# Patient Record
Sex: MALE | Race: BLACK OR AFRICAN AMERICAN | NOT HISPANIC OR LATINO | Employment: FULL TIME | URBAN - METROPOLITAN AREA
[De-identification: names, ages, dates, MRNs, and addresses within clinical notes are randomized per-mention and may not be internally consistent; named-entity substitution may affect disease eponyms.]

---

## 2019-11-15 ENCOUNTER — OFFICE VISIT (OUTPATIENT)
Dept: OPHTHALMOLOGY | Facility: CLINIC | Age: 34
End: 2019-11-15
Payer: COMMERCIAL

## 2019-11-15 DIAGNOSIS — H18.421 BAND KERATOPATHY OF RIGHT EYE: ICD-10-CM

## 2019-11-15 DIAGNOSIS — H40.052 BORDERLINE GLAUCOMA OF LEFT EYE WITH OCULAR HYPERTENSION: ICD-10-CM

## 2019-11-15 DIAGNOSIS — H17.9 CORNEAL SCAR, RIGHT EYE: Primary | ICD-10-CM

## 2019-11-15 DIAGNOSIS — H50.111 EXOTROPIA OF RIGHT EYE: ICD-10-CM

## 2019-11-15 PROCEDURE — 92002 INTRM OPH EXAM NEW PATIENT: CPT | Performed by: STUDENT IN AN ORGANIZED HEALTH CARE EDUCATION/TRAINING PROGRAM

## 2019-11-15 ASSESSMENT — CONF VISUAL FIELD
OD_SUPERIOR_NASAL_RESTRICTION: 1
OD_INFERIOR_NASAL_RESTRICTION: 1
METHOD: COUNTING FINGERS
OS_NORMAL: 1
OD_INFERIOR_TEMPORAL_RESTRICTION: 1
OD_SUPERIOR_TEMPORAL_RESTRICTION: 1

## 2019-11-15 ASSESSMENT — TONOMETRY
OS_IOP_MMHG: 25
OD_IOP_MMHG: XX
OS_IOP_MMHG: 25
IOP_METHOD: APPLANATION
OD_IOP_MMHG: 21
IOP_METHOD: ICARE

## 2019-11-15 ASSESSMENT — SLIT LAMP EXAM - LIDS
COMMENTS: NORMAL
COMMENTS: NORMAL

## 2019-11-15 ASSESSMENT — VISUAL ACUITY
OD_SC: NLP
OS_SC+: -2
OS_SC: 20/20
METHOD: SNELLEN - LINEAR

## 2019-11-15 ASSESSMENT — EXTERNAL EXAM - LEFT EYE: OS_EXAM: NORMAL

## 2019-11-15 ASSESSMENT — REFRACTION_MANIFEST
OS_AXIS: 170
OS_SPHERE: PLANO
OS_CYLINDER: +0.25

## 2019-11-15 ASSESSMENT — EXTERNAL EXAM - RIGHT EYE: OD_EXAM: LARGE XT

## 2019-11-15 ASSESSMENT — CUP TO DISC RATIO: OS_RATIO: 0.4

## 2019-11-15 NOTE — PROGRESS NOTES
Current Eye Medications:  none     Subjective:  Complete eye exam. Doesn't have any vision right eye for last 10 plus years. Was told possibly cataract. Patient does not recall a specific injury, but says that the eye was hurting and at the [place] and did [place] access to proper care. Vision is doing well left eye. Right eye is sensitive to touch, causes severe stabbing pain like going straight to heart.      Objective:  See Ophthalmology Exam.       Assessment:  Rayo Rey is a 34 year old male who presents with:   Encounter Diagnoses   Name Primary?     Corneal scar, right eye Not a likely candidate for corneal transplant, but desired referral with cornea specialist (perhaps consideration of chelation of band keratopathy).     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      Exotropia of right eye        Plan:  Recommend wearing polycarbonate glasses to protect the left eye    Referral to the [facility] (Dr. Bundy) for corneal evaluation    Jhon Mart MD  [phone]

## 2019-11-15 NOTE — PATIENT INSTRUCTIONS
Recommend wearing polycarbonate glasses to protect the left eye    Referral to the [facility] (Dr. Bundy) for corneal evaluation    John Mart MD  [phone]

## 2019-11-15 NOTE — LETTER
11/15/2019         RE: Rayo Rey  [address] [place] [address]        Dear Colleague,    Thank you for referring your patient, Rayo Rey, to the [facility]. Please see a copy of my visit note below.     Current Eye Medications:  none     Subjective:  Complete eye exam. Doesn't have any vision right eye for last 10 plus years. Was told possibly cataract. Patient does not recall a specific injury, but says that the eye was hurting and at the time [place] and did not have access to proper care. Vision is doing well left eye. Right eye is sensitive to touch, causes severe stabbing pain like going straight to heart.      Objective:  See Ophthalmology Exam.       Assessment:  Rayo Rey is a 34 year old male who presents with:   Encounter Diagnoses   Name Primary?     Corneal scar, right eye Not a likely candidate for corneal transplant, but desired referral with cornea specialist (perhaps consideration of chelation of band keratopathy).     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      Exotropia of right eye        Plan:  Recommend wearing polycarbonate glasses to protect the left eye    Referral to the [facility] (Dr. Bundy) for corneal evaluation    John Mart MD  [phone]        Again, thank you for allowing me to participate in the care of your patient.        Sincerely,        John Mart MD

## 2019-12-23 ENCOUNTER — OFFICE VISIT (OUTPATIENT)
Dept: OPHTHALMOLOGY | Facility: CLINIC | Age: 34
End: 2019-12-23
Attending: STUDENT IN AN ORGANIZED HEALTH CARE EDUCATION/TRAINING PROGRAM
Payer: COMMERCIAL

## 2019-12-23 DIAGNOSIS — H40.002 GLAUCOMA SUSPECT OF LEFT EYE: Primary | ICD-10-CM

## 2019-12-23 DIAGNOSIS — H18.421 BAND KERATOPATHY OF RIGHT EYE: ICD-10-CM

## 2019-12-23 DIAGNOSIS — H44.529 PHTHISICAL CORNEA: ICD-10-CM

## 2019-12-23 DIAGNOSIS — H17.9 CORNEAL SCAR, RIGHT EYE: ICD-10-CM

## 2019-12-23 PROBLEM — H17.11 CENTRAL CORNEAL OPACITY OF RIGHT EYE: Status: ACTIVE | Noted: 2019-11-06

## 2019-12-23 PROCEDURE — 76514 ECHO EXAM OF EYE THICKNESS: CPT | Mod: ZF,50 | Performed by: OPHTHALMOLOGY

## 2019-12-23 PROCEDURE — 76512 OPH US DX B-SCAN: CPT | Mod: ZF,RT | Performed by: OPHTHALMOLOGY

## 2019-12-23 PROCEDURE — G0463 HOSPITAL OUTPT CLINIC VISIT: HCPCS | Mod: ZF

## 2019-12-23 ASSESSMENT — CUP TO DISC RATIO: OS_RATIO: 0.35

## 2019-12-23 ASSESSMENT — EXTERNAL EXAM - RIGHT EYE: OD_EXAM: LARGE XT

## 2019-12-23 ASSESSMENT — SLIT LAMP EXAM - LIDS
COMMENTS: NORMAL
COMMENTS: NORMAL

## 2019-12-23 ASSESSMENT — EXTERNAL EXAM - LEFT EYE: OS_EXAM: NORMAL

## 2019-12-23 ASSESSMENT — TONOMETRY
OD_IOP_MMHG: 55
IOP_METHOD: ICARE
IOP_METHOD: ICARE
OD_IOP_MMHG: 55
OS_IOP_MMHG: 24
OS_IOP_MMHG: 30

## 2019-12-23 ASSESSMENT — PACHYMETRY
OS_CT(UM): 630
OD_CT(UM): 200

## 2019-12-23 ASSESSMENT — CONF VISUAL FIELD
OD_SUPERIOR_NASAL_RESTRICTION: 1
OD_SUPERIOR_TEMPORAL_RESTRICTION: 1
OD_INFERIOR_TEMPORAL_RESTRICTION: 1
OD_INFERIOR_NASAL_RESTRICTION: 1

## 2019-12-23 ASSESSMENT — VISUAL ACUITY
OS_SC: J1+
OD_SC: NLP
OS_SC: 20/20
METHOD: SNELLEN - LINEAR

## 2019-12-23 NOTE — NURSING NOTE
Chief Complaints and History of Present Illnesses   Patient presents with     Corneal Evaluation     Chief Complaint(s) and History of Present Illness(es)     Corneal Evaluation     Laterality: right eye    Onset: 26 years ago    Severity: complete loss of vision    Frequency: constantly    Timing: throughout the day    Associated symptoms: Negative for eye pain, redness, tearing, itching, discharge and dryness    Treatments tried: no treatments    Pain scale: 0/10              Comments     Eval for Band Keratopathy of RE per referral request of Dr. Mart.  RE painful to touch, otherwise comfortable. Pt states that at that time, it is an intolerable sharp stabbing pain, a 10/10 on pain scale. It can take up to a minute to resolve.   Pt states has no recollection of any eye trauma that would have occurred to bring on pain and poor VA of RE. Pt states that these symptoms started in 1993. [place] was [place], much conflict in [place].   Pt states currently not using any eye meds or art tears.  Nicole Snell, COT COT 1:46 PM 12/23/2019

## 2019-12-23 NOTE — LETTER
"12/23/2019     RE: Rayo Rey  [address] [place] [address]     Dear Colleague,    Thank you for referring your patient, Rayo Rey, to the EYE CLINIC at [facility]. Please see a copy of my visit note below.    CC: cloudy vision right eye    HPI: Rayo eRy is a 34 year old male here for evaluation of cloudy vision in the right eye. Patient noted tearing/eye pain/blurry vision beginning at age 9 while in [facility]. His vision rapidly progressed to \"completely dark\" within a couple of years. He has not seen an eye care provider since onset of vision loss up until last month when he saw Dr Mart, who noted NLP vision, corneal scarring with band K, and mild ocular hypertension (in the left eye). He was referred here for evaluation of K scarring/band K and to discuss if anything could be done to improve the vision.     Past medical hx: None    Past ocular hx: as above    Ocular gtts: none    ASSESSMENT/PLAN:   1) Corneal scarring/band keratopathy, right eye  -unknown etiology- painful vision occurred at age 9 while in [facility]  -NLP vision  -high IOP (likely lower than measured with icare due to scarring, but feels moderately firm to palpation). 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 High IOP OD>>OS.  -right eye very sensitive to touch, possibly early phthisical.    2) ocular hypertension, left eye  -healthy appearing nerve  -? " Family history of glaucoma- uncle  -recommend glaucoma eval with HVF, rnfl    --    --------------------------------------------------------------------------------------------------------------------------------------------  Pachymetry - Interpretation & Report  Indication: glaucoma suspect OS  Performed by: Jules Bundy MD  Reliability: good  Patient cooperation: good  Findings:   Right eye:  200 micrometers centrally    Left eye:  630 micrometers centrally   Interval Change, Assessment, & Impact on treatment:   Right eye:  Thin, unreliable due to band keratopathy   Left eye:  Thick corneal, low risk of glaucoma   Signed: Jules Bundy MD 12/23/2019 11:36 PM        Again, thank you for allowing me to participate in the care of your patient.      Sincerely,    Jules Bundy MD

## 2019-12-23 NOTE — PROGRESS NOTES
"CC: cloudy vision right eye    HPI: Rayo Rey is a 34 year old male here for evaluation of cloudy vision in the right eye. Patient noted tearing/eye pain/blurry vision beginning at age 9 while in [facility]. His vision rapidly progressed to \"completely dark\" within a couple of years. He has not seen an eye care provider since onset of vision loss up until last month when he saw Dr Mart, who noted NLP vision, corneal scarring with band K, and mild ocular hypertension (in the left eye). He was referred here for evaluation of K scarring/band K and to discuss if anything could be done to improve the vision.     Past medical hx: None    Past ocular hx: as above    Ocular gtts: none    ASSESSMENT/PLAN:   1) Corneal scarring/band keratopathy, right eye  -unknown etiology- painful vision occurred at age 9 while in [facility]  -NLP vision  -high IOP (likely lower than measured with icare due to scarring, but feels moderately firm to palpation). 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 High IOP OD>>OS.  -right eye very sensitive to touch, possibly early phthisical.    2) ocular hypertension, left eye  -healthy appearing nerve  -? Family history of glaucoma- uncle  -recommend glaucoma eval with HVF, rnfl    --  Clive Johns MD  PGY 5, Cornea Fellow  Ophthalmology    Attending Physician Attestation:  Complete documentation of historical and exam elements from today's encounter can be " found in the full encounter summary report (not reduplicated in this progress note).  I personally obtained the chief complaint(s) and history of present illness.  I confirmed and edited as necessary the review of systems, past medical/surgical history, family history, social history, and examination findings as documented by others; and I examined the patient myself.  I personally reviewed the relevant tests, images, and reports as documented above.  I formulated and edited as necessary the assessment and plan and discussed the findings and management plan with the patient and family. I personally reviewed the ophthalmic test(s) associated with this encounter, agree with the interpretation(s) as documented by the resident/fellow, and have edited the corresponding report(s) as necessary. - Jules Bundy MD    --------------------------------------------------------------------------------------------------------------------------------------------  Pachymetry - Interpretation & Report  Indication: glaucoma suspect OS  Performed by: Jules Bundy MD  Reliability: good  Patient cooperation: good  Findings:   Right eye:  200 micrometers centrally    Left eye:  630 micrometers centrally   Interval Change, Assessment, & Impact on treatment:   Right eye:  Thin, unreliable due to band keratopathy   Left eye:  Thick corneal, low risk of glaucoma   Signed: Jules Bundy MD 12/23/2019 11:36 PM      I personally spent great than 40min with the patient, of which >50% of the time was spent face to face with the patient, counseling and coordinating care with the patient. We discussed the complexity of his diagnosis, the need for further information prior to proceeding with yet another surgery, and the unknown prognosis for the patient at this time.    Jules Bundy MD

## 2020-02-26 ENCOUNTER — OFFICE VISIT (OUTPATIENT)
Dept: OPHTHALMOLOGY | Facility: CLINIC | Age: 35
End: 2020-02-26
Attending: OPHTHALMOLOGY
Payer: COMMERCIAL

## 2020-02-26 DIAGNOSIS — H18.421 BAND KERATOPATHY OF RIGHT EYE: ICD-10-CM

## 2020-02-26 DIAGNOSIS — H40.052 BORDERLINE GLAUCOMA OF LEFT EYE WITH OCULAR HYPERTENSION: ICD-10-CM

## 2020-02-26 DIAGNOSIS — H44.529 PHTHISICAL CORNEA: Primary | ICD-10-CM

## 2020-02-26 PROCEDURE — 25000125 ZZHC RX 250: Mod: ZF

## 2020-02-26 PROCEDURE — G0463 HOSPITAL OUTPT CLINIC VISIT: HCPCS | Mod: ZF

## 2020-02-26 ASSESSMENT — TONOMETRY
IOP_METHOD: TONOPEN
OS_IOP_MMHG: 28
OD_IOP_MMHG: 85

## 2020-02-26 ASSESSMENT — CONF VISUAL FIELD
OD_SUPERIOR_TEMPORAL_RESTRICTION: 1
OD_INFERIOR_NASAL_RESTRICTION: 1
OD_SUPERIOR_NASAL_RESTRICTION: 1
OD_INFERIOR_TEMPORAL_RESTRICTION: 1
METHOD: COUNTING FINGERS
OS_NORMAL: 1

## 2020-02-26 ASSESSMENT — VISUAL ACUITY
OD_SC: NLP
OS_SC: 20/20
METHOD: SNELLEN - LINEAR

## 2020-02-26 ASSESSMENT — EXTERNAL EXAM - LEFT EYE: OS_EXAM: NORMAL

## 2020-02-26 ASSESSMENT — SLIT LAMP EXAM - LIDS
COMMENTS: NORMAL
COMMENTS: NORMAL

## 2020-02-26 ASSESSMENT — EXTERNAL EXAM - RIGHT EYE: OD_EXAM: LARGE XT

## 2020-02-26 ASSESSMENT — CUP TO DISC RATIO: OS_RATIO: 0.4

## 2020-02-26 NOTE — NURSING NOTE
Chief Complaints and History of Present Illnesses   Patient presents with     Follow Up     Corneal scarring/band keratopathy, right eye     Chief Complaint(s) and History of Present Illness(es)     Follow Up     Laterality: right eye    Course: stable    Associated symptoms: eye pain and redness (mild in right eye).  Negative for tearing and dryness    Comments: Corneal scarring/band keratopathy, right eye              Comments     He states that he has pain when something touches his right eye.  He does not have have pain in that eye if it is not being touched.  His vision in the left eye seems stable.    Yue Tierney, COT 9:49 AM  February 26, 2020

## 2020-02-26 NOTE — PROGRESS NOTES
"CC: cloudy vision right eye    HPI: Rayo Rey is a 34 year old male here for evaluation of cloudy vision in the right eye. Patient noted tearing/eye pain/blurry vision beginning at age 9 while in [facility]. His vision rapidly progressed to \"completely dark\" within a couple of years. He has not seen an eye care provider since onset of vision loss up until last month when he saw Dr Mart, who noted NLP vision, corneal scarring with band K, and mild ocular hypertension (in the left eye). He was referred here for evaluation of K scarring/band K and to discuss if anything could be done to improve the vision.     Interval Hx: States has some right eye pain only when he touches the eye. No pain when not touching the eye. Vision stable left eye. No floaters, flashes of light, photophobia or pain in left eye.    Past medical hx: None    Past ocular hx: as above    Ocular gtts: none    ASSESSMENT/PLAN:   1) Corneal scarring/band keratopathy, right eye  -unknown etiology - painful vision occurred at age 9 while in [facility]  -NLP vision  -high IOP (likely lower than measured with icare due to scarring, but feels moderately firm to palpation). 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 High IOP OD>>OS  -could consider EDTA chelation of band K for comfort +/- K tattooing vs CL for cosmesis  -right eye very sensitive to touch, possibly early phthisical  -discussed with patient today and will proceed with EDTA chelation OD     2) ocular hypertension, left eye  -? Family history of glaucoma- uncle  -recommend glaucoma eval with HVF, rnfl    Follow-up: Schedule EDTA chelation OD. Glaucoma next " available    Roscoe Rubio MD  Ophthalmology Resident, PGY-3    Attending Physician Attestation:  Complete documentation of historical and exam elements from today's encounter can be found in the full encounter summary report (not reduplicated in this progress note).  I personally obtained the chief complaint(s) and history of present illness.  I confirmed and edited as necessary the review of systems, past medical/surgical history, family history, social history, and examination findings as documented by others; and I examined the patient myself.  I personally reviewed the relevant tests, images, and reports as documented above.  I formulated and edited as necessary the assessment and plan and discussed the findings and management plan with the patient and family. - Jules Bundy MD

## 2020-03-11 NOTE — PROGRESS NOTES
"CC: cloudy vision right eye    HPI: Rayo Rey is a 34 year old male here for evaluation of cloudy vision in the right eye. Patient noted tearing/eye pain/blurry vision beginning at age 9 while in [facility]. His vision rapidly progressed to \"completely dark\" within a couple of years. He has not seen an eye care provider since onset of vision loss up until last month when he saw Dr Mart, who noted NLP vision, corneal scarring with band K, and mild ocular hypertension (in the left eye). He was referred here for evaluation of K scarring/band K and to discuss if anything could be done to improve the vision.     Interval Hx: States has some right eye pain only when he touches the eye. No pain when not touching the eye. Vision stable left eye. No floaters, flashes of light, photophobia or pain in left eye.    Past medical hx: None    Past ocular hx: as above    Ocular gtts: none    ASSESSMENT/PLAN:   1) Corneal scarring/band keratopathy, right eye  -unknown etiology - painful vision occurred at age 9 while in [facility]  -NLP vision  -high IOP (likely lower than measured with icare due to scarring, but feels moderately firm to palpation). 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 High IOP OD>>OS  -recommend EDTA chelation of band K for comfort +/- K tattooing vs CL for cosmesis  -right eye very sensitive to touch, possibly early phthisical  -discussed with patient today and will proceed with EDTA chelation OD     2) ocular hypertension, left eye  -? Family history of glaucoma- uncle  -recommend glaucoma eval with HVF, rnfl    Follow-up: 1 week, Glaucoma next available    Attending Physician " Attestation:  Complete documentation of historical and exam elements from today's encounter can be found in the full encounter summary report (not reduplicated in this progress note).  I personally obtained the chief complaint(s) and history of present illness.  I confirmed and edited as necessary the review of systems, past medical/surgical history, family history, social history, and examination findings as documented by others; and I examined the patient myself.  I personally reviewed the relevant tests, images, and reports as documented above.  I formulated and edited as necessary the assessment and plan and discussed the findings and management plan with the patient and family. I was present for the key portions of the procedure and immediately available for the remainder. - Jules Bundy MD

## 2020-03-13 ENCOUNTER — OFFICE VISIT (OUTPATIENT)
Dept: OPHTHALMOLOGY | Facility: CLINIC | Age: 35
End: 2020-03-13
Attending: OPHTHALMOLOGY
Payer: COMMERCIAL

## 2020-03-13 DIAGNOSIS — H18.421 BAND KERATOPATHY OF RIGHT EYE: ICD-10-CM

## 2020-03-13 DIAGNOSIS — Z98.890 POSTOPERATIVE EYE STATE: Primary | ICD-10-CM

## 2020-03-13 PROCEDURE — G0463 HOSPITAL OUTPT CLINIC VISIT: HCPCS | Mod: ZF

## 2020-03-13 PROCEDURE — 65436 CURETTE/TREAT CORNEA: CPT | Mod: ZF | Performed by: OPHTHALMOLOGY

## 2020-03-13 RX ORDER — IBUPROFEN 200 MG
400 TABLET ORAL ONCE
Status: COMPLETED | OUTPATIENT
Start: 2020-03-13 | End: 2020-03-13

## 2020-03-13 RX ORDER — PREDNISOLONE ACETATE 10 MG/ML
1 SUSPENSION/ DROPS OPHTHALMIC 4 TIMES DAILY
Qty: 10 ML | Refills: 1 | Status: SHIPPED | OUTPATIENT
Start: 2020-03-13 | End: 2020-08-05

## 2020-03-13 RX ORDER — OFLOXACIN 3 MG/ML
1 SOLUTION/ DROPS OPHTHALMIC 4 TIMES DAILY
Qty: 10 ML | Refills: 1 | Status: SHIPPED | OUTPATIENT
Start: 2020-03-13 | End: 2020-08-05

## 2020-03-13 RX ADMIN — Medication 400 MG: at 13:06

## 2020-03-13 RX ADMIN — Medication: at 13:21

## 2020-03-13 ASSESSMENT — TONOMETRY
IOP_METHOD: ICARE
OS_IOP_MMHG: 37
OD_IOP_MMHG: 18

## 2020-03-13 ASSESSMENT — VISUAL ACUITY
METHOD: SNELLEN - LINEAR
OS_SC: 20/20
OD_SC: NLP

## 2020-03-13 ASSESSMENT — CONF VISUAL FIELD
OS_NORMAL: 1
OD_INFERIOR_NASAL_RESTRICTION: 1
OD_SUPERIOR_NASAL_RESTRICTION: 1
OD_INFERIOR_TEMPORAL_RESTRICTION: 1
OD_SUPERIOR_TEMPORAL_RESTRICTION: 1

## 2020-03-13 NOTE — PATIENT INSTRUCTIONS
Start ofloxacin four times daily  Start prednisolone acetate four times daily  Start artificial tears (preservative free) sample provided but you will need to buy more, 4 times daily or as needed for comfort    The eye will be red and irritated today and pink tears are normal. Do not get water in the eye.   Use artificial tears as needed for comfort. Celluvisc gel may provide further comfort.    Work note was provided for off work until next visit on Monday.

## 2020-03-13 NOTE — NURSING NOTE
Chief Complaints and History of Present Illnesses   Patient presents with     Follow Up     Chief Complaint(s) and History of Present Illness(es)     Follow Up     Laterality: both eyes    Onset: gradual    Onset: months ago    Quality: States va is the same since last visit      Associated symptoms: Negative for dryness and tearing    Pain scale: 0/10              Comments     KELVIN Vega COT 10:26 AM March 13, 2020

## 2020-03-13 NOTE — LETTER
March 13, 2020      Re: Rayo Rey   1985    To Whom It May Concern:    This is to confirm that the above patient was seen on 3/13/2020 and had procedure performed on the right eye.  Rayo Rey is unable to return to work until Tuesday 3/17/2020. He also has an appointment for follow up on Monday, 3/23/2020 at 3pm.     Thank you for your cooperation in this matter.  Please do not hesitate to contact me if you have any further questions.    Sincerely,      KIMBERLY ROJAS

## 2020-03-14 ASSESSMENT — SLIT LAMP EXAM - LIDS
COMMENTS: NORMAL
COMMENTS: NORMAL

## 2020-03-14 ASSESSMENT — EXTERNAL EXAM - LEFT EYE: OS_EXAM: NORMAL

## 2020-03-14 ASSESSMENT — EXTERNAL EXAM - RIGHT EYE: OD_EXAM: LARGE XT

## 2020-03-14 ASSESSMENT — CUP TO DISC RATIO: OS_RATIO: 0.4

## 2020-03-23 ENCOUNTER — TELEPHONE (OUTPATIENT)
Dept: OPHTHALMOLOGY | Facility: CLINIC | Age: 35
End: 2020-03-23

## 2020-03-23 ENCOUNTER — OFFICE VISIT (OUTPATIENT)
Dept: OPHTHALMOLOGY | Facility: CLINIC | Age: 35
End: 2020-03-23
Attending: OPHTHALMOLOGY
Payer: COMMERCIAL

## 2020-03-23 DIAGNOSIS — H40.002 GLAUCOMA SUSPECT OF LEFT EYE: ICD-10-CM

## 2020-03-23 DIAGNOSIS — H18.421 BAND KERATOPATHY OF RIGHT EYE: ICD-10-CM

## 2020-03-23 DIAGNOSIS — H17.9 CORNEAL SCAR, RIGHT EYE: ICD-10-CM

## 2020-03-23 DIAGNOSIS — H40.052 OCULAR HYPERTENSION, LEFT EYE: Primary | ICD-10-CM

## 2020-03-23 DIAGNOSIS — H44.529 PHTHISICAL CORNEA: ICD-10-CM

## 2020-03-23 DIAGNOSIS — H40.052 OCULAR HYPERTENSION, LEFT EYE: ICD-10-CM

## 2020-03-23 PROCEDURE — 92081 LIMITED VISUAL FIELD XM: CPT | Mod: ZF | Performed by: OPHTHALMOLOGY

## 2020-03-23 PROCEDURE — 92133 CPTRZD OPH DX IMG PST SGM ON: CPT | Mod: ZF | Performed by: OPHTHALMOLOGY

## 2020-03-23 PROCEDURE — G0463 HOSPITAL OUTPT CLINIC VISIT: HCPCS | Mod: ZF

## 2020-03-23 RX ORDER — MINERAL OIL/PETROLATUM,WHITE 20%-80%
1 OINTMENT (GRAM) OPHTHALMIC (EYE) 4 TIMES DAILY PRN
Qty: 1 TUBE | Refills: 11 | Status: SHIPPED | OUTPATIENT
Start: 2020-03-23

## 2020-03-23 RX ORDER — LATANOPROST 50 UG/ML
1 SOLUTION/ DROPS OPHTHALMIC AT BEDTIME
Qty: 1 BOTTLE | Refills: 11 | Status: SHIPPED | OUTPATIENT
Start: 2020-03-23 | End: 2020-09-23

## 2020-03-23 RX ORDER — TIMOLOL MALEATE 5 MG/ML
1 SOLUTION/ DROPS OPHTHALMIC EVERY MORNING
Qty: 1 BOTTLE | Refills: 11 | Status: SHIPPED | OUTPATIENT
Start: 2020-03-23 | End: 2021-05-12

## 2020-03-23 ASSESSMENT — VISUAL ACUITY
OD_SC: NLP
OS_SC: 20/15
METHOD: SNELLEN - LINEAR

## 2020-03-23 ASSESSMENT — SLIT LAMP EXAM - LIDS
COMMENTS: NORMAL
COMMENTS: NORMAL

## 2020-03-23 ASSESSMENT — CONF VISUAL FIELD
OD_INFERIOR_NASAL_RESTRICTION: 1
OS_NORMAL: 1
OD_SUPERIOR_NASAL_RESTRICTION: 1
OD_SUPERIOR_TEMPORAL_RESTRICTION: 1
OD_INFERIOR_TEMPORAL_RESTRICTION: 1

## 2020-03-23 ASSESSMENT — REFRACTION_WEARINGRX
OD_SPHERE: BALANCE
OS_AXIS: 170
OS_CYLINDER: +0.25
OS_SPHERE: PLANO

## 2020-03-23 ASSESSMENT — EXTERNAL EXAM - LEFT EYE: OS_EXAM: NORMAL

## 2020-03-23 ASSESSMENT — TONOMETRY
OD_IOP_MMHG: 12
IOP_METHOD: ICARE
OS_IOP_MMHG: 30

## 2020-03-23 ASSESSMENT — CUP TO DISC RATIO: OS_RATIO: 0.4

## 2020-03-23 ASSESSMENT — EXTERNAL EXAM - RIGHT EYE: OD_EXAM: LARGE XT

## 2020-03-23 NOTE — PROGRESS NOTES
"CC: cloudy vision right eye    HPI: Rayo Rey is a 34 year old male here for evaluation of cloudy vision in the right eye. Patient noted tearing/eye pain/blurry vision beginning at age 9 while in [facility]. His vision rapidly progressed to \"completely dark\" within a couple of years. He has not seen an eye care provider since onset of vision loss up until last month when he saw Dr Mart, who noted NLP vision, corneal scarring with band K, and mild ocular hypertension (in the left eye). He was referred here for evaluation of K scarring/band K and to discuss if anything could be done to improve the vision.     Interval Hx: States has some right eye pain only when he touches the eye has improved with ETDA therapy. No pain when not touching the eye. Vision stable left eye. No floaters, flashes of light, photophobia or pain in left eye.    Past medical hx: None    Past ocular hx:   S/p EDTA chelation for band K OD 3/13/2020    Ocular gtts:   Ofloxacin qid  Prednisolone acetate     ASSESSMENT/PLAN:   1) Corneal scarring/band keratopathy, right eye  S/p EDTA chelation for band K right eye 3/13/2020   - unknown etiology - painful vision occurred at age 9 while in [facility]   - NLP vision   - high IOP (likely lower than measured with icare due to scarring, but feels moderately firm to palpation). 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   - discussed findings with patient, very guarded prognosis given long duration of NLP vision   - possibly with severe glaucoma that has been untreated. High IOP OD>>OS   - consider K tattooing however right eye is exotropic vs CL for cosmesis does not sit centered due to irregular surface and may make current exotropia more apparent.   - Will " need to send request for ink to [facility] at least 1 month in advance of surgery due to special order.   - right eye sensitive to touch improved with EDTA procedure    2) ocular hypertension, left eye   -? Family history of glaucoma- uncle   - reiterated importance of recommended glaucoma eval, scheduled in 2 months with Dr. Meza   - Baseline HVF, rnfl oct today - wnl   -Tmax in our clinic 37 left eye   - start latanoprost left eye at bedtime    Follow-up: cornea clinic in 2 months.   Glaucoma next available    Attending Physician Attestation:  Complete documentation of historical and exam elements from today's encounter can be found in the full encounter summary report (not reduplicated in this progress note).  I personally obtained the chief complaint(s) and history of present illness.  I confirmed and edited as necessary the review of systems, past medical/surgical history, family history, social history, and examination findings as documented by others; and I examined the patient myself.  I personally reviewed the relevant tests, images, and reports as documented above.  I formulated and edited as necessary the assessment and plan and discussed the findings and management plan with the patient and family. I personally reviewed the ophthalmic test(s) associated with this encounter, agree with the interpretation(s) as documented by the resident/fellow, and have edited the corresponding report(s) as necessary. - Jules Bundy MD    I personally spent great than 40min with the patient, of which >50% of the time was spent face to face with the patient, counseling and coordinating care with the patient. We discussed the complexity of his diagnosis, the need for further information prior to proceeding with yet another surgery, and the unknown prognosis for the patient at this time.    Jules Bundy MD

## 2020-03-23 NOTE — PATIENT INSTRUCTIONS
Stop ofloxacin  Decrease prednisolone acetate to three times daily for 1 week, then twice daily for one week, then once daily for one week then stop in the RIGHT eye  Start latanoprost at bedtime LEFT eye   (you do not need timolol, your doctor decided to use latanoprost instead)  START lubricating ointment in the right eye for comfort (may use retain PM, soothe PM or other preservative free lubricating ointment)

## 2020-03-23 NOTE — NURSING NOTE
Chief Complaints and History of Present Illnesses   Patient presents with     Follow Up     1 week follow up recommend EDTA chelation of band K     Chief Complaint(s) and History of Present Illness(es)     Follow Up     Comments: 1 week follow up recommend EDTA chelation of band K              Comments     Pt states vision is about the same as last visit. No eye pain today.  Some redness in RE today.    RENA Leone March 23, 2020 1:29 PM

## 2020-03-23 NOTE — TELEPHONE ENCOUNTER
COVID-19 RESCHEDULES    Date contacted: March 23, 2020 12:14 PM    Type of contact: left message WITH CONTACT EUNICE ON FILE    Current appointment date: 3/23/20    Attending provider: KIMBERLY ROJAS    Current Eye Symptoms: NA    Refills needed: NA    Best contact for rescheduling: ON FILE WITH PATIENT OR EUNICE     Best date/time for rescheduling: ALSED TO COME IN EARLIER TIME TODAY FOR APPT OR TO GET A MESSAGE TO PROVIDER IN CLINIC TO CALL BACK WITH FOLLOW UP CARE INSTRUCTIONS IF NOT COMING IN FOR APPT EARLIER.    Staci Villanueva, COA COA 12:14 PM March 23, 2020

## 2020-08-05 ENCOUNTER — OFFICE VISIT (OUTPATIENT)
Dept: OPHTHALMOLOGY | Facility: CLINIC | Age: 35
End: 2020-08-05
Attending: OPHTHALMOLOGY
Payer: COMMERCIAL

## 2020-08-05 DIAGNOSIS — H17.9 CORNEAL SCAR AND OPACITY: Primary | ICD-10-CM

## 2020-08-05 PROCEDURE — G0463 HOSPITAL OUTPT CLINIC VISIT: HCPCS | Mod: ZF

## 2020-08-05 ASSESSMENT — CONF VISUAL FIELD
OD_SUPERIOR_TEMPORAL_RESTRICTION: 1
OD_INFERIOR_TEMPORAL_RESTRICTION: 1
OD_INFERIOR_NASAL_RESTRICTION: 1
OD_SUPERIOR_NASAL_RESTRICTION: 1

## 2020-08-05 ASSESSMENT — EXTERNAL EXAM - RIGHT EYE: OD_EXAM: LARGE XT

## 2020-08-05 ASSESSMENT — TONOMETRY
IOP_METHOD: TONOPEN
OS_IOP_MMHG: 18
OD_IOP_MMHG: 13

## 2020-08-05 ASSESSMENT — VISUAL ACUITY
OD_SC: NLP
OS_SC: 20/15
METHOD: SNELLEN - LINEAR
OS_SC+: -1

## 2020-08-05 ASSESSMENT — SLIT LAMP EXAM - LIDS
COMMENTS: NORMAL
COMMENTS: NORMAL

## 2020-08-05 ASSESSMENT — EXTERNAL EXAM - LEFT EYE: OS_EXAM: NORMAL

## 2020-08-05 NOTE — NURSING NOTE
"Chief Complaints and History of Present Illnesses   Patient presents with     Follow Up     Chief Complaint(s) and History of Present Illness(es)     Follow Up     Laterality: right eye    Course: stable    Associated symptoms: Negative for eye pain    Treatments tried: no treatments    Response to treatment: mild improvement    Pain scale: 0/10              Comments     5mo recheck K scarring/band keratopathy OD    Vision stable. Ocular pain (OD) has improved since LV, now only hurts \"when [he] touches it.\"    Ocular meds: none (denies obtaining Latanoprost)     Jenny Loo COT 12:28 PM August 5, 2020                   "

## 2020-08-05 NOTE — PROGRESS NOTES
"CC: cloudy vision right eye     HPI: Rayo Rey is a 34 year old male here for evaluation of cloudy vision in the right eye. Patient noted tearing/eye pain/blurry vision beginning at age 9 while in [facility]. His vision rapidly progressed to \"completely dark\" within a couple of years. He has not seen an eye care provider since onset of vision loss up until last month when he saw Dr Mart, who noted NLP vision, corneal scarring with band K, and mild ocular hypertension (in the left eye). He was referred here for evaluation of K scarring/band K and to discuss if anything could be done to improve the vision.      Interval Hx:   States has some right eye pain only when he touches the eye.  No tearing or discharge. Vision stable left eye. No floaters, flashes of light, photophobia or pain in left eye. Patient is using latanoprost in the left eye. He used retain PM for the right eye for a while when he had FBS. He stopped using it because the sensation is gone.      Past medical hx: None     Past ocular hx:   S/p EDTA chelation for band K OD 3/13/2020     Ocular gtts:   Latanoprost BID left eye      ASSESSMENT/PLAN:   1) Corneal scarring/band keratopathy, right eye  S/p EDTA chelation for band K right eye 3/13/2020              - unknown etiology - painful vision occurred at age 9 while in [facility]              - NLP vision              - high IOP (likely lower than measured with icare due to scarring, but feels moderately firm to palpation). 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              - discussed findings with patient, very guarded prognosis given long duration of NLP vision              - possibly with severe glaucoma that has " been untreated. High IOP OD>>OS              - consider K tattooing however right eye is exotropic vs CL for cosmesis does not sit centered due to irregular surface and may make current exotropia more apparent.              - right eye sensitive to touch improved with EDTA procedure   - R/B/A discussed, patient wishes to proceed - SK (for small residual band) + K tatoo OD   - Monocular precaution discussed     2) ocular hypertension, left eye              -? Family history of glaucoma- uncle              - Baseline HVF, rnfl oct 3/2020- wnl              -Tmax in our clinic 37 left eye. Today IOP left eye 18 on BID latanoprost              - Continue latanoprost left eye once at bedtime   - Recommend glaucoma evaluation in 6 months    RTC in 4-6 months  RTC glaucoma/general clinic in 6 months for glaucoma follow-up    Gianfranco Mendoza MD  Ophthalmology PGY-3     Attending Physician Attestation:  Complete documentation of historical and exam elements from today's encounter can be found in the full encounter summary report (not reduplicated in this progress note).  I personally obtained the chief complaint(s) and history of present illness.  I confirmed and edited as necessary the review of systems, past medical/surgical history, family history, social history, and examination findings as documented by others; and I examined the patient myself.  I personally reviewed the relevant tests, images, and reports as documented above.  I formulated and edited as necessary the assessment and plan and discussed the findings and management plan with the patient and family. - Jules Bundy MD    I personally spent great than 40min with the patient, of which >50% of the time was spent face to face with the patient, counseling and coordinating care with the patient. We discussed the complexity of his diagnosis, the need for further information prior to proceeding with yet another surgery, and the unknown prognosis for the patient at  this time.    Jules Bundy MD      Contact:  uncle - [phone]

## 2020-08-21 ENCOUNTER — TELEPHONE (OUTPATIENT)
Dept: OPHTHALMOLOGY | Facility: CLINIC | Age: 35
End: 2020-08-21

## 2020-08-21 DIAGNOSIS — Z11.59 ENCOUNTER FOR SCREENING FOR OTHER VIRAL DISEASES: Primary | ICD-10-CM

## 2020-08-21 PROBLEM — H17.9 CORNEAL SCAR AND OPACITY: Status: ACTIVE | Noted: 2020-08-21

## 2020-08-24 NOTE — TELEPHONE ENCOUNTER
FUTURE VISIT INFORMATION      SURGERY INFORMATION:    Date: 9/22/20    [facility]    Surgeon:  Jules Bundy MD     Anesthesia Type:  Combined MAC with Retrobulbar     Procedure: POSSIBLE KERATECTOMY, SUPERFICIAL, CORNEAL TATOO - RIGHT EYE     Consult: ov 8/5    RECORDS REQUESTED FROM:       Primary Care Provider: Senait Escobar

## 2020-09-14 DIAGNOSIS — H18.421 BAND KERATOPATHY OF RIGHT EYE: Primary | ICD-10-CM

## 2020-09-17 DIAGNOSIS — H17.9 CORNEAL SCAR AND OPACITY: ICD-10-CM

## 2020-09-17 DIAGNOSIS — H18.421 BAND KERATOPATHY OF RIGHT EYE: Primary | ICD-10-CM

## 2020-09-17 RX ORDER — OFLOXACIN 3 MG/ML
1 SOLUTION/ DROPS OPHTHALMIC 4 TIMES DAILY
Qty: 5 ML | Refills: 3 | Status: SHIPPED | OUTPATIENT
Start: 2020-09-17 | End: 2021-11-17

## 2020-09-17 RX ORDER — PREDNISOLONE ACETATE 10 MG/ML
1 SUSPENSION/ DROPS OPHTHALMIC 4 TIMES DAILY
Qty: 5 ML | Refills: 1 | Status: SHIPPED | OUTPATIENT
Start: 2020-09-17 | End: 2021-11-17

## 2020-09-18 ENCOUNTER — ANESTHESIA EVENT (OUTPATIENT)
Dept: SURGERY | Facility: AMBULATORY SURGERY CENTER | Age: 35
End: 2020-09-18

## 2020-09-18 ENCOUNTER — PRE VISIT (OUTPATIENT)
Dept: SURGERY | Facility: CLINIC | Age: 35
End: 2020-09-18

## 2020-09-18 ENCOUNTER — VIRTUAL VISIT (OUTPATIENT)
Dept: SURGERY | Facility: CLINIC | Age: 35
End: 2020-09-18
Payer: COMMERCIAL

## 2020-09-18 VITALS — WEIGHT: 150 LBS | HEIGHT: 67 IN | BODY MASS INDEX: 23.54 KG/M2

## 2020-09-18 DIAGNOSIS — Z11.59 ENCOUNTER FOR SCREENING FOR OTHER VIRAL DISEASES: ICD-10-CM

## 2020-09-18 DIAGNOSIS — Z01.818 PREOP EXAMINATION: Primary | ICD-10-CM

## 2020-09-18 ASSESSMENT — PAIN SCALES - GENERAL: PAINLEVEL: NO PAIN (0)

## 2020-09-18 ASSESSMENT — MIFFLIN-ST. JEOR: SCORE: 1569.03

## 2020-09-18 ASSESSMENT — LIFESTYLE VARIABLES: TOBACCO_USE: 0

## 2020-09-18 NOTE — PROGRESS NOTES
"Rayo Rey is a 35 year old male who is being evaluated via a billable video visit.      The patient has been notified of following:     \"This video visit will be conducted via a call between you and your physician/provider. We have found that certain health care needs can be provided without the need for an in-person physical exam.  This service lets us provide the care you need with a video conversation.  If a prescription is necessary we can send it directly to your pharmacy.  If lab work is needed we can place an order for that and you can then stop by our lab to have the test done at a later time.    Video visits are billed at different rates depending on your insurance coverage.  Please reach out to your insurance provider with any questions.    If during the course of the call the physician/provider feels a video visit is not appropriate, you will not be charged for this service.\"    Patient has given verbal consent for Video visit? Yes  How would you like to obtain your AVS? Mail a copy  If you are dropped from the video visit, the video invite should be resent to: Text to cell phone: [phone]  Will anyone else be joining your video visit? No        Sal Samuels      "

## 2020-09-18 NOTE — ANESTHESIA PREPROCEDURE EVALUATION
"Anesthesia Pre-Procedure Evaluation    Patient: Rayo Rey   MRN:     [id number] Gender:   male   Age:    35 year old :      1985        Preoperative Diagnosis: * No surgery found *        LABS:  CBC: No results found for: WBC, HGB, HCT, PLT  BMP: No results found for: NA, POTASSIUM, CHLORIDE, CO2, BUN, CR, GLC  COAGS: No results found for: PTT, INR, FIBR  POC: No results found for: BGM, HCG, HCGS  OTHER: No results found for: PH, LACT, A1C, MELLO, PHOS, MAG, ALBUMIN, PROTTOTAL, ALT, AST, GGT, ALKPHOS, BILITOTAL, BILIDIRECT, LIPASE, AMYLASE, GLADYS, TSH, T4, T3, CRP, SED     Preop Vitals    BP Readings from Last 3 Encounters:   No data found for BP    Pulse Readings from Last 3 Encounters:   No data found for Pulse      Resp Readings from Last 3 Encounters:   No data found for Resp    SpO2 Readings from Last 3 Encounters:   No data found for SpO2      Temp Readings from Last 1 Encounters:   No data found for Temp    Ht Readings from Last 1 Encounters:   20 1.702 m (5' 7\")      Wt Readings from Last 1 Encounters:   20 68 kg (150 lb)    Estimated body mass index is 23.49 kg/m  as calculated from the following:    Height as of this encounter: 1.702 m (5' 7\").    Weight as of this encounter: 68 kg (150 lb).     LDA:        Past Medical History:   Diagnosis Date     Corneal scar     right eye      No past surgical history on file.   No Known Allergies     Anesthesia Evaluation     . Pt has not had prior anesthetic            ROS/MED HX    ENT/Pulmonary:  - neg pulmonary ROS    (-) tobacco use   Neurologic:  - neg neurologic ROS     Cardiovascular:  - neg cardiovascular ROS   (+) ----. : . . . :. . No previous cardiac testing       METS/Exercise Tolerance: Comment: He plays soccer >4 METS   Hematologic:  - neg hematologic  ROS       Musculoskeletal:  - neg musculoskeletal ROS       GI/Hepatic:  - neg GI/hepatic ROS       Renal/Genitourinary:  - ROS Renal section negative       Endo:  - neg endo ROS     "   Psychiatric:  - neg psychiatric ROS       Infectious Disease:  - neg infectious disease ROS       Malignancy:      - no malignancy   Other:    (+) no H/O Chronic Pain,                       PHYSICAL EXAM:   Mental Status/Neuro:    Airway: Facies: Feasible  Mallampati: II   Respiratory: Auscultation: CTAB      CV: Rhythm: Regular   Comments:                      Assessment:   ASA SCORE: 1    H&P: History and physical reviewed and following examination; no interval change.   Smoking Status:  Non-Smoker/Unknown   NPO Status: NPO Appropriate     Plan:   Anes. Type:  MAC   Pre-Medication: None   Induction:  N/a   Airway: Native Airway   Access/Monitoring: PIV   Maintenance: N/a     Postop Plan:   Postop Pain: None  Postop Sedation/Airway: Not planned  Disposition: Outpatient     PONV Management:   Adult Risk Factors:, Non-Smoker   Prevention: Ondansetron     CONSENT: Direct conversation   Plan and risks discussed with: Patient                   PAC Discussion and Assessment    ASA Classification: 1  Case is suitable for: ASC  Anesthetic techniques and relevant risks discussed: MAC with GA as backup  Invasive monitoring and risk discussed:   Types:   Possibility and Risk of blood transfusion discussed:   NPO instructions given:   Additional anesthetic preparation and risks discussed:   Needs early admission to pre-op area:   Other:     PAC Resident/NP Anesthesia Assessment:  Rayo Rey is a 35 year old scheduled for POSSIBLE KERATECTOMY, SUPERFICIAL, CORNEAL TATOO - RIGHT EYE on 9/22/2020 by Dr. Bundy in treatment of corneal scar and opacity.  PAC referral for risk assessment and optimization for anesthesia:    No history of anesthesia.  Pre-operative considerations:  1.  Cardiac:  Functional status- METS >4.  Low risk surgery with 0.4% (RCRI #) risk of major adverse cardiac event.  Denies any cardiac history.  2.  Pulm:  Airway feasible.  HERMILO risk: low.  Never smoker  3.  GI:  Risk of PONV score = 2.  If > 2, anti-emetic  intervention recommended.      VTE risk: 0.5%    Patient is optimized and is acceptable candidate for the proposed procedure.  No further diagnostic evaluation is needed.         **For further details of assessment, testing, and physical exam please see H and P completed on same date.          Lori Cox PA-C, [facility]      Reviewed and Signed by PAC Mid-Level Provider/Resident  Mid-Level Provider/Resident: Lori Cox  Date: 9/18/2020  Time:     Attending Anesthesiologist Anesthesia Assessment:        Anesthesiologist:   Date:   Time:   Pass/Fail:   Disposition:     PAC Pharmacist Assessment:        Pharmacist:   Date:   Time:    Lori Cox PA-C

## 2020-09-18 NOTE — H&P
Pre-Operative H & P     CC:  Preoperative exam to assess for increased cardiopulmonary risk while undergoing surgery and anesthesia.    Date of Encounter: 9/18/2020  Primary Care Physician:  Umair, Senait Escobar  Associated diagnosis: corneal scar and opacity of right eye    HPI  Rayo Rey is a 35 year old male who presents via video for pre-operative H & P in preparation for POSSIBLE KERATECTOMY, SUPERFICIAL, CORNEAL TATOO - RIGHT EYE with Dr. Bundy on 9/22 at [facility]. MAC with retrobulbar anesthesia.    This is a 35 year old male with unremarkable PMH.  He has cloudy vision in the right eye.  This began when he was 9 years old in [facility].  His vision then became  completely dark  over the  next couple of years.  He did not seek care for his eye until July of this year.  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  His right eye does bother him when touched but he denies any discharge.  Vision stable in the left eye.  The above procedure is now planned.     History is obtained from the patient, the medical record, and his Uncle Bradly who is present for this visit.    Past Medical History  Past Medical History:   Diagnosis Date     Corneal scar     right eye       Past Surgical History  No past surgical history on file.    Hx of Blood transfusions/reactions: denies     Hx of abnormal bleeding or anti-platelet use: denies    Menstrual history: No LMP for male patient.:     Steroid use in the last year: denies    Personal or FH with difficulty with Anesthesia:  denies    Prior to Admission Medications  Current Outpatient Medications   Medication Sig Dispense Refill     edetate 3% ophthalmic irrigation soln 3% SOLN EDTA FOR INTRA-OP USE ON 9/22/20. 10 mL 1     latanoprost (XALATAN) 0.005 % ophthalmic solution Place 1 drop Into the left eye At Bedtime 1 Bottle 11     ofloxacin (OCUFLOX) 0.3 % ophthalmic solution  Place 1 drop into the right eye 4 times daily 5 mL 3     prednisoLONE acetate (PRED FORTE) 1 % ophthalmic suspension Place 1 drop into the right eye 4 times daily 5 mL 1     timolol maleate (TIMOPTIC) 0.5 % ophthalmic solution Place 1 drop Into the left eye every morning 1 Bottle 11     White Petrolatum-Mineral Oil (RETAINE PM) OINT Apply 1 Application to eye 4 times daily as needed (discomfort/irritation right eye) 1 Tube 11       Allergies  No Known Allergies    Social History  Social History     Socioeconomic History     Marital status: Single     Spouse name: Not on file     Number of children: Not on file     Years of education: Not on file     Highest education level: Not on file   Occupational History     Not on file   Social Needs     Financial resource strain: Not on file     Food insecurity     Worry: Not on file     Inability: Not on file     Transportation needs     Medical: Not on file     Non-medical: Not on file   Tobacco Use     Smoking status: Never Smoker     Smokeless tobacco: Never Used   Substance and Sexual Activity     Alcohol use: Not on file     Drug use: Yes     Comment: occasionally     Sexual activity: Not on file   Lifestyle     Physical activity     Days per week: Not on file     Minutes per session: Not on file     Stress: Not on file   Relationships     Social connections     Talks on phone: Not on file     Gets together: Not on file     Attends Anabaptism service: Not on file     Active member of club or organization: Not on file     Attends meetings of clubs or organizations: Not on file     Relationship status: Not on file     Intimate partner violence     Fear of current or ex partner: Not on file     Emotionally abused: Not on file     Physically abused: Not on file     Forced sexual activity: Not on file   Other Topics Concern     Not on file   Social History Narrative     Not on file       Family History  Family History   Problem Relation Age of Onset     Glaucoma No family hx  "of      Macular Degeneration No family hx of            Anesthesia Evaluation     . Pt has not had prior anesthetic            ROS/MED HX    ENT/Pulmonary:  - neg pulmonary ROS    (-) tobacco use   Neurologic:  - neg neurologic ROS     Cardiovascular:  - neg cardiovascular ROS   (+) ----. : . . . :. . No previous cardiac testing       METS/Exercise Tolerance: Comment: He plays soccer >4 METS   Hematologic:  - neg hematologic  ROS       Musculoskeletal:  - neg musculoskeletal ROS       GI/Hepatic:  - neg GI/hepatic ROS       Renal/Genitourinary:  - ROS Renal section negative       Endo:  - neg endo ROS       Psychiatric:  - neg psychiatric ROS       Infectious Disease:  - neg infectious disease ROS       Malignancy:      - no malignancy   Other:    (+) no H/O Chronic Pain,           The complete review of systems is negative other than noted in the HPI or here.       150 lbs 0 oz  5' 7\"   Body mass index is 23.49 kg/m .       Physical Exam    Please refer to the physical examination documented by the anesthesiologist in the anesthesia record on the day of surgery    Constitutional: Awake, alert, cooperative, no apparent distress, and appears stated age.  Respiratory: non-labored breathing.  Neurologic: Awake, alert, oriented to name, place and time. Neuropsychiatric: Calm, cooperative. Normal affect.       Outside records reviewed from: n/a    ASSESSMENT and PLAN  Rayo Rey is a 35 year old scheduled for POSSIBLE KERATECTOMY, SUPERFICIAL, CORNEAL TATOO - RIGHT EYE on 9/22/2020 by Dr. Bundy in treatment of corneal scar and opacity.  PAC referral for risk assessment and optimization for anesthesia:    No history of anesthesia.  Pre-operative considerations:  1.  Cardiac:  Functional status- METS >4.  Low risk surgery with 0.4% (RCRI #) risk of major adverse cardiac event.  Denies any cardiac history.  2.  Pulm:  Airway feasible.  HERMILO risk: low.  Never smoker  3.  GI:  Risk of PONV score = 2.  If > 2, anti-emetic " intervention recommended.      VTE risk: 0.5%    Patient is optimized and is acceptable candidate for the proposed procedure.  No further diagnostic evaluation is needed.       Lori Cox PA-C  Preoperative Assessment Center  [facility]  Phone: [phone]  Fax: [phone]

## 2020-09-18 NOTE — PATIENT INSTRUCTIONS
Preparing for Your Surgery      Name:  Rayo Rey   MRN:  [id number]   :  1985   Today's Date:  2020       Arriving for surgery:  Surgery date:  20  Arrival time:  08:00 am    Restrictions due to COVID 19:  Patients are allowed one visitor in the pre-op period  All visitors must wear a mask  No visitors under 18  No ill visitors   parking is not available     Please come to:     [facility]  [address] [place] [id number]  -  Proceed to the 5th floor to check into the Ambulatory Surgery Center.              >> There will be patient concierges on the 1st and 5th floor, for assistance or an escort, if you would like.              >> Please call [phone] with any questions.    What can I eat or drink?  -  You may eat and drink normally for up to 8 hours before your surgery.   -  You may have clear liquids until 2 hours before surgery  Examples of clear liquids:  Water  Clear broth  Juices (apple, white grape, white cranberry  and cider) without pulp  Noncarbonated, powder based beverages  (lemonade and Varinder-Aid)  Sodas (Sprite, 7-Up, ginger ale and seltzer)  Coffee or tea (without milk or cream)  Gatorade    -  No Alcohol for at least 24 hours before surgery     Which medicines can I take?    Hold Aspirin for 7 days before surgery.   Hold Multivitamins for 7 days before surgery.  Hold Supplements for 7 days before surgery.  Hold Ibuprofen (Advil, Motrin) for 1 day before surgery--unless otherwise directed by surgeon.  Hold Naproxen (Aleve) for 4 days before surgery.  -  PLEASE TAKE these medications the day of surgery:  Tylenol if needed.    How do I prepare myself?  - Please take 2 showers before surgery using Scrubcare or Hibiclens soap.    Use this soap only from the neck to your toes.     Leave the soap on your skin for one minute--then rinse thoroughly.      You may use your own shampoo and conditioner; no other hair products.   - Please remove all  jewelry and body piercings.  - No lotions, deodorants or fragrance.  - No makeup or fingernail polish.   - Bring your ID and insurance card.    - All patients are required to have a Covid-19 test within 4 days of surgery/procedure.      -Patients will be contacted by the [facility] scheduling team within 1 week of surgery to make an appointment.      - Patients may call the Scheduling team at [phone] if they have not been scheduled within 4 days of  surgery.      ALL PATIENTS GOING HOME THE SAME DAY OF SURGERY ARE REQUIRED TO HAVE A RESPONSIBLE ADULT TO DRIVE AND BE IN ATTENDANCE WITH THEM FOR 24 HOURS FOLLOWING SURGERY     Questions or Concerns:    - For any questions regarding the day of surgery or your hospital stay, please contact the Pre Admission Nursing Office at [phone].       - If you have health changes between today and your surgery please call your surgeon.       For questions after surgery please call your surgeons office.

## 2020-09-19 LAB
SARS-COV-2 RNA SPEC QL NAA+PROBE: NOT DETECTED
SPECIMEN SOURCE: NORMAL

## 2020-09-22 ENCOUNTER — ANESTHESIA (OUTPATIENT)
Dept: SURGERY | Facility: AMBULATORY SURGERY CENTER | Age: 35
End: 2020-09-22

## 2020-09-22 ENCOUNTER — HOSPITAL ENCOUNTER (OUTPATIENT)
Facility: AMBULATORY SURGERY CENTER | Age: 35
End: 2020-09-22
Attending: OPHTHALMOLOGY
Payer: COMMERCIAL

## 2020-09-22 VITALS
SYSTOLIC BLOOD PRESSURE: 130 MMHG | OXYGEN SATURATION: 99 % | HEART RATE: 56 BPM | TEMPERATURE: 98 F | RESPIRATION RATE: 16 BRPM | DIASTOLIC BLOOD PRESSURE: 93 MMHG

## 2020-09-22 DIAGNOSIS — H17.9 CORNEAL SCAR AND OPACITY: ICD-10-CM

## 2020-09-22 DIAGNOSIS — H17.9 CORNEAL SCAR AND OPACITY: Primary | ICD-10-CM

## 2020-09-22 RX ORDER — LIDOCAINE 40 MG/G
CREAM TOPICAL
Status: DISCONTINUED | OUTPATIENT
Start: 2020-09-22 | End: 2020-09-22 | Stop reason: HOSPADM

## 2020-09-22 RX ORDER — PROPOFOL 10 MG/ML
INJECTION, EMULSION INTRAVENOUS PRN
Status: DISCONTINUED | OUTPATIENT
Start: 2020-09-22 | End: 2020-09-22

## 2020-09-22 RX ORDER — OXYCODONE HYDROCHLORIDE 5 MG/1
5 TABLET ORAL EVERY 4 HOURS PRN
Status: DISCONTINUED | OUTPATIENT
Start: 2020-09-22 | End: 2020-09-23 | Stop reason: HOSPADM

## 2020-09-22 RX ORDER — ACETAMINOPHEN 325 MG/1
975 TABLET ORAL ONCE
Status: COMPLETED | OUTPATIENT
Start: 2020-09-22 | End: 2020-09-22

## 2020-09-22 RX ORDER — SODIUM CHLORIDE, SODIUM LACTATE, POTASSIUM CHLORIDE, CALCIUM CHLORIDE 600; 310; 30; 20 MG/100ML; MG/100ML; MG/100ML; MG/100ML
INJECTION, SOLUTION INTRAVENOUS CONTINUOUS
Status: DISCONTINUED | OUTPATIENT
Start: 2020-09-22 | End: 2020-09-23 | Stop reason: HOSPADM

## 2020-09-22 RX ORDER — ONDANSETRON 2 MG/ML
INJECTION INTRAMUSCULAR; INTRAVENOUS PRN
Status: DISCONTINUED | OUTPATIENT
Start: 2020-09-22 | End: 2020-09-22

## 2020-09-22 RX ORDER — ONDANSETRON 4 MG/1
4 TABLET, ORALLY DISINTEGRATING ORAL EVERY 30 MIN PRN
Status: DISCONTINUED | OUTPATIENT
Start: 2020-09-22 | End: 2020-09-23 | Stop reason: HOSPADM

## 2020-09-22 RX ORDER — PROPARACAINE HYDROCHLORIDE 5 MG/ML
1 SOLUTION/ DROPS OPHTHALMIC ONCE
Status: COMPLETED | OUTPATIENT
Start: 2020-09-22 | End: 2020-09-22

## 2020-09-22 RX ORDER — GABAPENTIN 300 MG/1
300 CAPSULE ORAL ONCE
Status: COMPLETED | OUTPATIENT
Start: 2020-09-22 | End: 2020-09-22

## 2020-09-22 RX ORDER — DEXAMETHASONE SODIUM PHOSPHATE 4 MG/ML
INJECTION, SOLUTION INTRA-ARTICULAR; INTRALESIONAL; INTRAMUSCULAR; INTRAVENOUS; SOFT TISSUE PRN
Status: DISCONTINUED | OUTPATIENT
Start: 2020-09-22 | End: 2020-09-22 | Stop reason: HOSPADM

## 2020-09-22 RX ORDER — BALANCED SALT SOLUTION 6.4; .75; .48; .3; 3.9; 1.7 MG/ML; MG/ML; MG/ML; MG/ML; MG/ML; MG/ML
SOLUTION OPHTHALMIC PRN
Status: DISCONTINUED | OUTPATIENT
Start: 2020-09-22 | End: 2020-09-22 | Stop reason: HOSPADM

## 2020-09-22 RX ORDER — FENTANYL CITRATE 50 UG/ML
INJECTION, SOLUTION INTRAMUSCULAR; INTRAVENOUS PRN
Status: DISCONTINUED | OUTPATIENT
Start: 2020-09-22 | End: 2020-09-22

## 2020-09-22 RX ORDER — MEPERIDINE HYDROCHLORIDE 25 MG/ML
12.5 INJECTION INTRAMUSCULAR; INTRAVENOUS; SUBCUTANEOUS
Status: DISCONTINUED | OUTPATIENT
Start: 2020-09-22 | End: 2020-09-23 | Stop reason: HOSPADM

## 2020-09-22 RX ORDER — NALOXONE HYDROCHLORIDE 0.4 MG/ML
.1-.4 INJECTION, SOLUTION INTRAMUSCULAR; INTRAVENOUS; SUBCUTANEOUS
Status: DISCONTINUED | OUTPATIENT
Start: 2020-09-22 | End: 2020-09-23 | Stop reason: HOSPADM

## 2020-09-22 RX ORDER — MOXIFLOXACIN 5 MG/ML
1 SOLUTION/ DROPS OPHTHALMIC
Status: COMPLETED | OUTPATIENT
Start: 2020-09-22 | End: 2020-09-22

## 2020-09-22 RX ORDER — ONDANSETRON 2 MG/ML
4 INJECTION INTRAMUSCULAR; INTRAVENOUS EVERY 30 MIN PRN
Status: DISCONTINUED | OUTPATIENT
Start: 2020-09-22 | End: 2020-09-23 | Stop reason: HOSPADM

## 2020-09-22 RX ORDER — LIDOCAINE HYDROCHLORIDE 20 MG/ML
INJECTION, SOLUTION INFILTRATION; PERINEURAL PRN
Status: DISCONTINUED | OUTPATIENT
Start: 2020-09-22 | End: 2020-09-22

## 2020-09-22 RX ORDER — FENTANYL CITRATE 50 UG/ML
25-50 INJECTION, SOLUTION INTRAMUSCULAR; INTRAVENOUS
Status: DISCONTINUED | OUTPATIENT
Start: 2020-09-22 | End: 2020-09-22 | Stop reason: HOSPADM

## 2020-09-22 RX ORDER — PREDNISOLONE ACETATE 10 MG/ML
SUSPENSION/ DROPS OPHTHALMIC PRN
Status: DISCONTINUED | OUTPATIENT
Start: 2020-09-22 | End: 2020-09-22 | Stop reason: HOSPADM

## 2020-09-22 RX ORDER — SODIUM CHLORIDE, SODIUM LACTATE, POTASSIUM CHLORIDE, CALCIUM CHLORIDE 600; 310; 30; 20 MG/100ML; MG/100ML; MG/100ML; MG/100ML
500 INJECTION, SOLUTION INTRAVENOUS CONTINUOUS
Status: DISCONTINUED | OUTPATIENT
Start: 2020-09-22 | End: 2020-09-22 | Stop reason: HOSPADM

## 2020-09-22 RX ADMIN — LIDOCAINE HYDROCHLORIDE 20 MG: 20 INJECTION, SOLUTION INFILTRATION; PERINEURAL at 09:37

## 2020-09-22 RX ADMIN — SODIUM CHLORIDE, SODIUM LACTATE, POTASSIUM CHLORIDE, CALCIUM CHLORIDE 500 ML: 600; 310; 30; 20 INJECTION, SOLUTION INTRAVENOUS at 08:43

## 2020-09-22 RX ADMIN — PROPARACAINE HYDROCHLORIDE 1 DROP: 5 SOLUTION/ DROPS OPHTHALMIC at 08:27

## 2020-09-22 RX ADMIN — PROPOFOL 40 MG: 10 INJECTION, EMULSION INTRAVENOUS at 09:37

## 2020-09-22 RX ADMIN — FENTANYL CITRATE 25 MCG: 50 INJECTION, SOLUTION INTRAMUSCULAR; INTRAVENOUS at 10:07

## 2020-09-22 RX ADMIN — FENTANYL CITRATE 25 MCG: 50 INJECTION, SOLUTION INTRAMUSCULAR; INTRAVENOUS at 10:13

## 2020-09-22 RX ADMIN — GABAPENTIN 300 MG: 300 CAPSULE ORAL at 08:33

## 2020-09-22 RX ADMIN — ACETAMINOPHEN 975 MG: 325 TABLET ORAL at 08:33

## 2020-09-22 RX ADMIN — MOXIFLOXACIN 1 DROP: 5 SOLUTION/ DROPS OPHTHALMIC at 08:27

## 2020-09-22 RX ADMIN — MOXIFLOXACIN 1 DROP: 5 SOLUTION/ DROPS OPHTHALMIC at 08:42

## 2020-09-22 RX ADMIN — FENTANYL CITRATE 50 MCG: 50 INJECTION, SOLUTION INTRAMUSCULAR; INTRAVENOUS at 09:31

## 2020-09-22 RX ADMIN — PROPOFOL 20 MG: 10 INJECTION, EMULSION INTRAVENOUS at 09:39

## 2020-09-22 RX ADMIN — MOXIFLOXACIN 1 DROP: 5 SOLUTION/ DROPS OPHTHALMIC at 08:32

## 2020-09-22 RX ADMIN — ONDANSETRON 4 MG: 2 INJECTION INTRAMUSCULAR; INTRAVENOUS at 09:31

## 2020-09-22 NOTE — ANESTHESIA CARE TRANSFER NOTE
Patient: Rayo Rey    Procedure(s):  KERATECTOMY, SUPERFICIAL, CORNEAL TATTOO - RIGHT EYE  Scrub ethylenediamenetetraacetic (EDTA)    Diagnosis: Corneal scar and opacity [H17.9]  Diagnosis Additional Information: No value filed.    Anesthesia Type:   MAC     Note:  Airway :Room Air  Patient transferred to:Phase II  Comments: Uneventful transport phase 2 room air paper facemask on  Report to RN - Teena  Exchanging well; color natl  Pt responds appropriately to command  IV patent  Lips/teeth/dentition as preop status  Pt comfortable  Questions answered  /93  HR 51  TAT 97.3  RR 16  Sat 99% room air  Handoff Report: Identifed the Patient, Identified the Reponsible Provider, Reviewed the pertinent medical history, Discussed the surgical course, Reviewed Intra-OP anesthesia mangement and issues during anesthesia, Set expectations for post-procedure period and Allowed opportunity for questions and acknowledgement of understanding      Vitals: (Last set prior to Anesthesia Care Transfer)    CRNA VITALS  9/22/2020 0959 - 9/22/2020 1038      9/22/2020             Pulse:  55    SpO2:  100 %    Resp Rate (set):  10                Electronically Signed By: DES ACEVEDO CRNA  September 22, 2020  10:38 AM

## 2020-09-22 NOTE — DISCHARGE INSTRUCTIONS
Instructions:    NO EYE DROPS while the patch is in place. Please bring all eye drops with you to your appointment tomorrow.    Keep the patch on all day and night. We will remove it for you at your post-op appointment tomorrow.    No heavy lifting > 20 lbs. No bending with head below waist. Avoid sleeping on operative side.    If you have worsening eye pain, redness, or decreased vision, please call the Eye Clinic right away at [phone].        [facility]  Home Care Following Anesthesia  For 24 hours after surgery:  1. Get plenty of rest.  A responsible adult must stay with you for at least 24 hours after you leave the surgery center.  2. Do not drive or use heavy equipment.  If you have weakness or tingling, don't drive or use heavy equipment until this feeling goes away.   3. Do not drink alcohol.   4. Avoid strenuous or risky activities.  Ask for help when climbing stairs.  5. You may feel lightheaded.  IF so, sit for a few minutes before standing.  Have someone help you get up.   6. If you have nausea (feel sick to your stomach): Drink only clear liquids such as apple juice, ginger ale, broth or 7-Up.  Rest may also help.  Be sure to drink enough fluids.  Move to a regular diet as you feel able.   7. You may have a slight fever.  Call the doctor if your fever is over 100 F (37.7 C) (taken under the tongue) or lasts longer than 24 hours.  8. You may have a dry mouth, a sore throat, muscle aches or trouble sleeping. These should go away after 24 hours.  9. Do not make important or legal decisions.               Tips for taking pain medications  To get the best pain relief possible, remember these points:    Take pain medications as directed, before pain becomes severe.    Pain medication can upset your stomach: taking it with food may help.    Constipation is a common side effect of pain medication. Drink plenty of  fluids.    Eat foods high in fiber. Take a stool  softener if recommended by your doctor or pharmacist.    Do not drink alcohol, drive or operate machinery while taking pain medications.    Ask about other ways to control pain, such as with heat, ice or relaxation.    Tylenol/Acetaminophen Consumption  To help encourage the safe use of acetaminophen, the makers of TYLENOL  have lowered the maximum daily dose for single-ingredient Extra Strength TYLENOL  (acetaminophen) products sold in the [place] from 8 pills per day (4,000 mg) to 6 pills per day (3,000 mg). The dosing interval has also changed from 2 pills every 4-6 hours to 2 pills every 6 hours.    If you feel your pain relief is insufficient, you may take Tylenol/Acetaminophen in addition to your narcotic pain medication.     Be careful not to exceed 3,000 mg of Tylenol/Acetaminophen in a 24 hour period from all sources.    If you are taking extra strength Tylenol/acetaminophen (500 mg), the maximum dose is 6 tablets in 24 hours.    If you are taking regular strength acetaminophen (325 mg), the maximum dose is 9 tablets in 24 hours.  **You received 975 mg of Tylenol at 8:30 AM. Okay to take at 2:30 PM, and follow dosing instructions on bottle.**    Call a doctor for any of the followin. Signs of infection (fever, growing tenderness at the surgery site, a large amount of drainage or bleeding, severe pain, foul-smelling drainage, redness, swelling).  2. It has been over 8 to 10 hours since surgery and you are still not able to urinate (pass water).  3. Headache for over 24 hours.  4. Signs of Covid-19 infection (temperature over 100 degrees, shortness of breath, cough, loss of taste/smell, generalized body aches, persistent headache, chills, sore throat, nausea/vomiting/diarrhea)    Your doctor is:  Dr. Jules Bundy, Ophthalmology: [phone]                    Or dial [phone] and ask for the resident on call for:  Ophthalmology  For emergency care, call the:  [facility]:  [phone]  (TTY for hearing impaired: [phone])

## 2020-09-22 NOTE — OP NOTE
OPERATIVE NOTE:     Name: Rayo Rey  Date: 9/22/2020     Pre-op Dx: Corneal scar, Right eye  Post-op Dx: Same     Procedure: To the Right eye  1. Superficial keratectomy  2. Corneal tattoo  3. Placement of bandage contact lens     Surgeon: Jules Bundy MD  Fellow: Sherrill Almanza MD     Description of Operating Procedure:  After discussion of the risks, benefits, and alternatives of the procedure, informed consent was obtained. The surgical eye was marked and the patient was brought to the minor operating room and placed in a supine position. With the assistance of the anesthesia team for sedation, a retrobulbar block was performed using a 50:50 mixture of 2% lidocaine and 0.75% Marcaine. The surgical eye was then prepped and draped in usual sterile ophthalmic fashion. The surgical microscope was then brought into appropriate position for the procedure. A wire lid speculum was placed to provide exposure.      Inspection of the eye showed dense corneal scar. 0.12 forceps were used to remove a focal area of calcification superonasally. A manually dissected lamellar pocket was started using a Addison blade inferotemporally and tunneling through the entire cornea using a crescent blade.  Permark sterile ink in the shade of Brown Black was injected into the lamellar pocket using a 27 gauge cannula. This did not achieve adequate coloration. The brown black ink was placed on the central cornea and 27 gauge needles were used to alvarez the anterior cornea and allow the ink to penetrate into the anterior stroma. This was repeated several times until an adequate stain was achieved. The eye was then copiously irrigated with BSS. Two 10-0 Nylon sutures were placed into the temporal pocket but were unable to be buried given the dense ink. Ancef and dexamethasone were injected into the anterior fornix. A bandage contact lens was placed and the lid speculum removed. One drop each of Pred Forte and ofloxacin was administered.  The eye was patched and shielded. The patient tolerated the procedure well.      Dr. Bundy was scrubbed and present for the entire procedure.     Sherrill Almanza MD  Cornea & External Disease Fellow    Jules Bundy MD   of Ophthalmology

## 2020-09-22 NOTE — ANESTHESIA POSTPROCEDURE EVALUATION
Anesthesia POST Procedure Evaluation    Patient: Rayo Rey   MRN:     [id number] Gender:   male   Age:    35 year old :      1985        Preoperative Diagnosis: Corneal scar and opacity [H17.9]   Procedure(s):  KERATECTOMY, SUPERFICIAL, CORNEAL TATTOO - RIGHT EYE  Scrub ethylenediamenetetraacetic (EDTA)   Postop Comments: No value filed.     Anesthesia Type: MAC       Disposition: Outpatient   Postop Pain Control: Uneventful            Sign Out: Well controlled pain   PONV: No   Neuro/Psych: Uneventful            Sign Out: Acceptable/Baseline neuro status   Airway/Respiratory: Uneventful            Sign Out: Acceptable/Baseline resp. status   CV/Hemodynamics: Uneventful            Sign Out: Acceptable CV status   Other NRE: NONE   DID A NON-ROUTINE EVENT OCCUR? No         Last Anesthesia Record Vitals:  CRNA VITALS  2020 0959 - 2020 1059      2020             Pulse:  55    SpO2:  100 %    Resp Rate (set):  10          Last PACU Vitals:  Vitals Value Taken Time   /93 2020 10:34 AM   Temp 36.3  C (97.3  F) 2020 10:34 AM   Pulse 56 2020 10:34 AM   Resp 16 2020 10:34 AM   SpO2 99 % 2020 10:34 AM   Temp src     NIBP 116/85 2020 10:28 AM   Pulse 55 2020 10:31 AM   SpO2 100 % 2020 10:31 AM   Resp     Temp     Ht Rate 52 2020 10:30 AM   Temp 2           Electronically Signed By: Travis Rosas MD, 2020, 2:10 PM

## 2020-09-23 ENCOUNTER — OFFICE VISIT (OUTPATIENT)
Dept: OPHTHALMOLOGY | Facility: CLINIC | Age: 35
End: 2020-09-23
Attending: OPHTHALMOLOGY
Payer: COMMERCIAL

## 2020-09-23 DIAGNOSIS — H40.052 OCULAR HYPERTENSION, LEFT EYE: ICD-10-CM

## 2020-09-23 PROCEDURE — G0463 HOSPITAL OUTPT CLINIC VISIT: HCPCS | Mod: ZF

## 2020-09-23 RX ORDER — LATANOPROST 50 UG/ML
1 SOLUTION/ DROPS OPHTHALMIC AT BEDTIME
Qty: 1 BOTTLE | Refills: 11 | Status: SHIPPED | OUTPATIENT
Start: 2020-09-23 | End: 2021-05-12

## 2020-09-23 ASSESSMENT — SLIT LAMP EXAM - LIDS
COMMENTS: NORMAL
COMMENTS: NORMAL

## 2020-09-23 ASSESSMENT — EXTERNAL EXAM - LEFT EYE: OS_EXAM: NORMAL

## 2020-09-23 ASSESSMENT — TONOMETRY
OS_IOP_MMHG: 34
OS_IOP_MMHG: 27
IOP_METHOD: ICARE
IOP_METHOD: TONOPEN
OD_IOP_MMHG: 09

## 2020-09-23 ASSESSMENT — VISUAL ACUITY
OS_SC+: +2
OS_SC: 20/20
OD_SC: NLP
METHOD: SNELLEN - LINEAR

## 2020-09-23 ASSESSMENT — EXTERNAL EXAM - RIGHT EYE: OD_EXAM: LARGE XT

## 2020-09-23 NOTE — NURSING NOTE
Chief Complaints and History of Present Illnesses   Patient presents with     Post Op (Ophthalmology) Right Eye     Chief Complaint(s) and History of Present Illness(es)     Post Op (Ophthalmology) Right Eye     Laterality: right eye    Pain scale: 1/10              Comments     One day POP RE keratectomy.  The patient notes he has a little pain.  Lorenza Neves, COA, COA 9:22 AM 09/23/2020

## 2020-09-23 NOTE — PROGRESS NOTES
"CC: cloudy vision right eye     HPI: Rayo Rey is a 34 year old male here for evaluation of cloudy vision in the right eye. Patient noted tearing/eye pain/blurry vision beginning at age 9 while in [facility]. His vision rapidly progressed to \"completely dark\" within a couple of years. He has not seen an eye care provider since onset of vision loss up until last month when he saw Dr Mart, who noted NLP vision, corneal scarring with band K, and mild ocular hypertension (in the left eye). He was referred here for evaluation of K scarring/band K and to discuss if anything could be done to improve the vision.      Interval Hx:   States has some right eye pain only when he touches the eye.  No tearing or discharge. Vision stable left eye. No floaters, flashes of light, photophobia or pain in left eye. Patient is using latanoprost in the left eye. He used retain PM for the right eye for a while when he had FBS. He stopped using it because the sensation is gone.      Past medical hx: None     Past ocular hx:   S/p EDTA chelation for band K OD 3/13/2020  S/p corneal tattoo right eye 9/22/20 - brown black ink     Ocular gtts:   Latanoprost BID left eye      ASSESSMENT/PLAN:   # S/p corneal tattoo right eye 9/22/20  - good post-op appearance, cosmesis significantly improved  - in future consider black ink if patient desires re-op  - temporal sutures were unable to be buried due to dense ink   - suture removal in 1 week (2 Nylons temporal)  - Meds:   - PF QID   - Ofloxacin QID   - Tears as needed    # Corneal scarring/band keratopathy, right eye  S/p EDTA chelation for band K right eye 3/13/2020- helped with sensitivity to touch  - unknown etiology - painful vision occurred at age 9 while in [facility]  - 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 Family history of glaucoma- uncle              - Baseline HVF, rnfl oct 3/2020- wnl              -Tmax in our clinic 37 left eye.   - He has stopped latanoprost - IOP 27, 34 today OS. Reviewed that maintaining normal IOP is VERY important for prevention of vision loss in this monocular patient.  - Latanoprost refill sent 9/23/20  - F/u glaucoma in next 1 month    RTC in 1 week- OK to remove temporal Nylon sutures x2, Slit lamp photo right eye     Sherrill Almanza MD  Cornea & External Disease Fellow    Attending Physician Attestation:  Complete documentation of historical and exam elements from today's encounter can be found in the full encounter summary report (not reduplicated in this progress note).  I personally obtained the chief complaint(s) and history of present illness.  I confirmed and edited as necessary the review of systems, past medical/surgical history, family history, social history, and examination findings as documented by others; and I examined the patient myself.  I personally reviewed the relevant tests, images, and reports as documented above.  I formulated and edited as necessary the assessment and plan and discussed the findings and management plan with the patient and family. - Jules Bundy MD

## 2020-09-23 NOTE — PATIENT INSTRUCTIONS
Eye Drop Instructions:    1. Prednisolone (pink/white top) - 1 drop, 4x / day RIGHT EYE  2. Ofloxacin (tan top) - 1 drop, 4x / day RIGHT EYE  3. Artificial tears as needed for comfort (sample given)    USE LATANOPROST (GREEN CAP) - 1 DROP AT BEDTIME IN THE LEFT EYE.    Space out all eye drops by 3-5 minutes.  Shake the eye drop before using.    Wear the shield at bedtime or anytime while sleeping for 1 week.     Avoid sleeping on operative side.    If you have worsening eye pain, redness, or decreased vision, please call the Eye Clinic right away at [phone].

## 2020-09-30 ENCOUNTER — OFFICE VISIT (OUTPATIENT)
Dept: OPHTHALMOLOGY | Facility: CLINIC | Age: 35
End: 2020-09-30
Attending: OPHTHALMOLOGY
Payer: COMMERCIAL

## 2020-09-30 DIAGNOSIS — Z98.890 POSTOPERATIVE EYE STATE: Primary | ICD-10-CM

## 2020-09-30 PROCEDURE — G0463 HOSPITAL OUTPT CLINIC VISIT: HCPCS | Mod: ZF

## 2020-09-30 PROCEDURE — 92285 EXTERNAL OCULAR PHOTOGRAPHY: CPT | Mod: ZF | Performed by: OPHTHALMOLOGY

## 2020-09-30 ASSESSMENT — EXTERNAL EXAM - RIGHT EYE: OD_EXAM: LARGE XT

## 2020-09-30 ASSESSMENT — VISUAL ACUITY
OS_SC: 20/20
OS_SC+: -1
OD_SC: NLP
METHOD: SNELLEN - LINEAR

## 2020-09-30 ASSESSMENT — SLIT LAMP EXAM - LIDS
COMMENTS: NORMAL
COMMENTS: NORMAL

## 2020-09-30 ASSESSMENT — TONOMETRY
IOP_METHOD: TONOPEN
IOP_METHOD: ICARE
OS_IOP_MMHG: 21
OD_IOP_MMHG: 10
OS_IOP_MMHG: 19

## 2020-09-30 ASSESSMENT — EXTERNAL EXAM - LEFT EYE: OS_EXAM: NORMAL

## 2020-09-30 NOTE — PATIENT INSTRUCTIONS
OFLOXACIN (TAN CAP): 2x/ DAY - RIGHT EYE    PREDNISOLONE (WHITE CAP): 4x/ DAY - RIGHT EYE    LATANOPROST (TEAL CAP): AT BEDTIME IN LEFT EYE

## 2020-09-30 NOTE — NURSING NOTE
Chief Complaints and History of Present Illnesses   Patient presents with     Post Op (Ophthalmology) Right Eye     Chief Complaint(s) and History of Present Illness(es)     Post Op (Ophthalmology) Right Eye     Pain scale: 0/10              Comments     POP  S/p corneal tattoo right eye 9/22/20.   The patient is using the Latanoprost in the left eye at night.  He is using the Prednisolone and the Ofloxacin four times a day in the right eye.  Lorenza Neves, COA, COA 3:11 PM 09/30/2020

## 2020-09-30 NOTE — PROGRESS NOTES
"CC: cloudy vision right eye     HPI: Rayo Rey is a 34 year old male here for evaluation of cloudy vision in the right eye. Patient noted tearing/eye pain/blurry vision beginning at age 9 while in [facility]. His vision rapidly progressed to \"completely dark\" within a couple of years. He has not seen an eye care provider since onset of vision loss up until last month when he saw Dr Mart, who noted NLP vision, corneal scarring with band K, and mild ocular hypertension (in the left eye). He was referred here for evaluation of K scarring/band K and to discuss if anything could be done to improve the vision.      Interval Hx:   Patient reports that the right eye is comfortable. He is using all of his eyedrops as prescribed, including the latanoprost which was refilled last visit. Redness improved.     Past medical hx: None     Past ocular hx:   S/p EDTA chelation for band K OD 3/13/2020  S/p corneal tattoo right eye 9/22/20 - brown black ink     Ocular gtts:   Latanoprost BID left eye   Prednisolone QID OD  Ofloxacin QID OD     ASSESSMENT/PLAN:   # S/p corneal tattoo right eye 9/22/20  - good post-op appearance, cosmesis significantly improved  - in future consider black ink if patient desires re-op  - temporal sutures were unable to be buried due to dense ink   - leave Nylon sutures and BCL for now, given he still has 1-2+ injection  - Meds:   - PF QID   - Decrease ofloxacin to BID   - Tears as needed    # Corneal scarring/band keratopathy, right eye  S/p EDTA chelation for band K right eye 3/13/2020- helped with sensitivity to touch  - unknown etiology - painful vision occurred at age 9 while in 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 Family history of " glaucoma- uncle              - Baseline HVF, rnfl oct 3/2020- wnl              - Tmax in our clinic 37 left eye.    - Patient self-stopped latanoprost but was restarted at last visit; IOP improved to 19   - Reiterated importance of latanoprost; refill sent 9/23/20   - F/u glaucoma in next 1 month    RTC in 1-2 weeks    Travis Angulo MD  Ophthalmology PGY-3  [facility]     Attending Physician Attestation:  Complete documentation of historical and exam elements from today's encounter can be found in the full encounter summary report (not reduplicated in this progress note).  I personally obtained the chief complaint(s) and history of present illness.  I confirmed and edited as necessary the review of systems, past medical/surgical history, family history, social history, and examination findings as documented by others; and I examined the patient myself.  I personally reviewed the relevant tests, images, and reports as documented above.  I formulated and edited as necessary the assessment and plan and discussed the findings and management plan with the patient and family. I personally reviewed the ophthalmic test(s) associated with this encounter, agree with the interpretation(s) as documented by the resident/fellow, and have edited the corresponding report(s) as necessary. - Jules Bundy MD

## 2020-10-14 ENCOUNTER — OFFICE VISIT (OUTPATIENT)
Dept: OPHTHALMOLOGY | Facility: CLINIC | Age: 35
End: 2020-10-14
Attending: OPHTHALMOLOGY
Payer: COMMERCIAL

## 2020-10-14 DIAGNOSIS — Z98.890 POSTOPERATIVE EYE STATE: ICD-10-CM

## 2020-10-14 DIAGNOSIS — H18.421 BAND KERATOPATHY OF RIGHT EYE: Primary | ICD-10-CM

## 2020-10-14 PROCEDURE — 99024 POSTOP FOLLOW-UP VISIT: CPT | Mod: GC | Performed by: OPHTHALMOLOGY

## 2020-10-14 PROCEDURE — G0463 HOSPITAL OUTPT CLINIC VISIT: HCPCS

## 2020-10-14 RX ORDER — PREDNISOLONE ACETATE 10 MG/ML
1 SUSPENSION/ DROPS OPHTHALMIC 4 TIMES DAILY
Qty: 10 ML | Refills: 0 | Status: SHIPPED | OUTPATIENT
Start: 2020-10-14 | End: 2021-11-17

## 2020-10-14 ASSESSMENT — CONF VISUAL FIELD
OD_INFERIOR_TEMPORAL_RESTRICTION: 1
OD_INFERIOR_NASAL_RESTRICTION: 1
OD_SUPERIOR_NASAL_RESTRICTION: 1
OS_NORMAL: 1
OD_SUPERIOR_TEMPORAL_RESTRICTION: 1

## 2020-10-14 ASSESSMENT — REFRACTION_WEARINGRX
OS_SPHERE: PLANO
OS_CYLINDER: +0.25
OD_SPHERE: BALANCE
OS_AXIS: 170

## 2020-10-14 ASSESSMENT — SLIT LAMP EXAM - LIDS
COMMENTS: NORMAL
COMMENTS: NORMAL

## 2020-10-14 ASSESSMENT — VISUAL ACUITY
METHOD: SNELLEN - LINEAR
OS_SC: 20/20
OD_SC: NLP

## 2020-10-14 ASSESSMENT — TONOMETRY
IOP_METHOD: ICARE
OS_IOP_MMHG: 23
OD_IOP_MMHG: 16

## 2020-10-14 ASSESSMENT — EXTERNAL EXAM - RIGHT EYE: OD_EXAM: LARGE XT

## 2020-10-14 ASSESSMENT — EXTERNAL EXAM - LEFT EYE: OS_EXAM: NORMAL

## 2020-10-14 NOTE — NURSING NOTE
Chief Complaints and History of Present Illnesses   Patient presents with     Follow Up     2 week follow up S/p corneal tattoo right eye 9/22/20     Chief Complaint(s) and History of Present Illness(es)     Follow Up     Laterality: right eye    Comments: 2 week follow up S/p corneal tattoo right eye 9/22/20              Comments     Pt states vision is the same as last visit. No eye pain today.   No flashes or floaters.     RENA Leone October 14, 2020 2:46 PM

## 2020-10-14 NOTE — PROGRESS NOTES
"CC: cloudy vision right eye     HPI: Rayo Rey is a 34 year old male here for evaluation of cloudy vision in the right eye. Patient noted tearing/eye pain/blurry vision beginning at age 9 while in [facility]. His vision rapidly progressed to \"completely dark\" within a couple of years. He has not seen an eye care provider since onset of vision loss up until last month when he saw Dr Mart, who noted NLP vision, corneal scarring with band K, and mild ocular hypertension (in the left eye). He was referred here for evaluation of K scarring/band K and to discuss if anything could be done to improve the vision.      Interval Hx:   Patient reports that the right eye is comfortable. He is using all of his eyedrops as prescribed, including the latanoprost which was refilled last visit. Redness improved.     Past medical hx: None     Past ocular hx:   S/p EDTA chelation for band K OD 3/13/2020  S/p corneal tattoo right eye 9/22/20 - brown black ink     Ocular gtts:   Latanoprost BID left eye   Prednisolone QID OD  Ofloxacin QID OD     ASSESSMENT/PLAN:   # S/p corneal tattoo right eye 9/22/20  - good post-op appearance, cosmesis significantly improved  - in future consider black ink if patient desires re-op  - temporal sutures were unable to be buried due to dense ink   - 10/14: remove Nylon sutures x2 today, BCL removed  - Meds:   - Continue PF QID OD   - Increase ofloxacin QID x 5 days, then stop   - Tears as needed    # Corneal scarring/band keratopathy, right eye  S/p EDTA chelation for band K right eye 3/13/2020- helped with sensitivity to touch  - unknown etiology - painful vision occurred at age 9 while in [facility]  - 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 Family " history of glaucoma- uncle              - Baseline HVF, rnfl oct 3/2020- wnl              - Tmax in our clinic 37 left eye.    - Patient self-stopped latanoprost but was restarted at last visit; IOP improved to 19   - Reiterated importance of latanoprost; refill sent 9/23/20   - F/u glaucoma in next 1 month --- still needs glc appt    RTC: in 1 month    Sherrill Almanza MD  Cornea & External Disease Fellow    Attending Physician Attestation:  Complete documentation of historical and exam elements from today's encounter can be found in the full encounter summary report (not reduplicated in this progress note).  I personally obtained the chief complaint(s) and history of present illness.  I confirmed and edited as necessary the review of systems, past medical/surgical history, family history, social history, and examination findings as documented by others; and I examined the patient myself.  I personally reviewed the relevant tests, images, and reports as documented above.  I formulated and edited as necessary the assessment and plan and discussed the findings and management plan with the patient and family. - Jules Bundy MD

## 2020-10-14 NOTE — PATIENT INSTRUCTIONS
OFLOXACIN (TAN CAP): Increase to 4x/ DAY for 5 days (until Mon 10/12) then STOP - RIGHT EYE    PREDNISOLONE (WHITE CAP): Continue 4x/day     LATANOPROST (TEAL CAP): AT BEDTIME IN LEFT EYE

## 2020-11-02 DIAGNOSIS — H40.002 GLAUCOMA SUSPECT OF LEFT EYE: Primary | ICD-10-CM

## 2020-11-11 ENCOUNTER — OFFICE VISIT (OUTPATIENT)
Dept: OPHTHALMOLOGY | Facility: CLINIC | Age: 35
End: 2020-11-11
Attending: OPHTHALMOLOGY
Payer: COMMERCIAL

## 2020-11-11 DIAGNOSIS — H18.421 BAND KERATOPATHY OF RIGHT EYE: ICD-10-CM

## 2020-11-11 DIAGNOSIS — Z98.890 POSTOPERATIVE EYE STATE: Primary | ICD-10-CM

## 2020-11-11 DIAGNOSIS — H44.529 PHTHISICAL CORNEA: ICD-10-CM

## 2020-11-11 DIAGNOSIS — H40.002 GLAUCOMA SUSPECT OF LEFT EYE: Primary | ICD-10-CM

## 2020-11-11 DIAGNOSIS — H40.002 GLAUCOMA SUSPECT OF LEFT EYE: ICD-10-CM

## 2020-11-11 PROCEDURE — G0463 HOSPITAL OUTPT CLINIC VISIT: HCPCS

## 2020-11-11 PROCEDURE — 99024 POSTOP FOLLOW-UP VISIT: CPT | Mod: GC | Performed by: OPHTHALMOLOGY

## 2020-11-11 PROCEDURE — 92083 EXTENDED VISUAL FIELD XM: CPT | Performed by: OPHTHALMOLOGY

## 2020-11-11 PROCEDURE — 99214 OFFICE O/P EST MOD 30 MIN: CPT | Mod: 24 | Performed by: OPHTHALMOLOGY

## 2020-11-11 PROCEDURE — 92133 CPTRZD OPH DX IMG PST SGM ON: CPT | Performed by: OPHTHALMOLOGY

## 2020-11-11 PROCEDURE — 999N000103 HC STATISTIC NO CHARGE FACILITY FEE

## 2020-11-11 ASSESSMENT — SLIT LAMP EXAM - LIDS
COMMENTS: NORMAL

## 2020-11-11 ASSESSMENT — CONF VISUAL FIELD
METHOD: COUNTING FINGERS
OD_INFERIOR_NASAL_RESTRICTION: 1
OD_SUPERIOR_TEMPORAL_RESTRICTION: 1
OD_SUPERIOR_TEMPORAL_RESTRICTION: 1
METHOD: COUNTING FINGERS
OD_SUPERIOR_NASAL_RESTRICTION: 1
OD_SUPERIOR_NASAL_RESTRICTION: 1
OD_INFERIOR_NASAL_RESTRICTION: 1
OD_INFERIOR_TEMPORAL_RESTRICTION: 1
OS_NORMAL: 1
OD_INFERIOR_TEMPORAL_RESTRICTION: 1
OS_NORMAL: 1

## 2020-11-11 ASSESSMENT — CUP TO DISC RATIO: OS_RATIO: 0.4

## 2020-11-11 ASSESSMENT — EXTERNAL EXAM - LEFT EYE
OS_EXAM: NORMAL
OS_EXAM: NORMAL

## 2020-11-11 ASSESSMENT — PACHYMETRY: OS_CT(UM): 626

## 2020-11-11 ASSESSMENT — TONOMETRY
OS_IOP_MMHG: 21
OS_IOP_MMHG: XX
OD_IOP_MMHG: 13
IOP_METHOD: APPLANATION
OD_IOP_MMHG: 11
IOP_METHOD: TONOPEN
OD_IOP_MMHG: 13
OS_IOP_MMHG: XX
IOP_METHOD: APPLANATION
OS_IOP_MMHG: 21
OD_IOP_MMHG: 11
IOP_METHOD: TONOPEN

## 2020-11-11 ASSESSMENT — VISUAL ACUITY
OD_SC: NLP
OS_SC: 20/20
METHOD: SNELLEN - LINEAR
OD_SC: NLP
OS_SC: 20/20
METHOD: SNELLEN - LINEAR

## 2020-11-11 ASSESSMENT — EXTERNAL EXAM - RIGHT EYE
OD_EXAM: LARGE XT
OD_EXAM: LARGE XT

## 2020-11-11 NOTE — NURSING NOTE
Chief Complaint(s) and History of Present Illness(es)     Post Op (Ophthalmology) Right Eye     In right eye.  Associated symptoms include Negative for dryness, redness, eye pain and tearing.  Pain was noted as 0/10.              Comments     1 month f/u for s/p corneal tattoo RE (09/22/20). Vision in the LE is the same no changes. RE also the same no changes. Pt denies any eye pain for discomfort.     Ocular meds:  Latanoprost QHS left eye   Prednisolone QID OD    RENA Alegre 2:40 PM November 11, 2020

## 2020-11-11 NOTE — PROGRESS NOTES
"CC: cloudy vision right eye     HPI: Rayo Rey is a 34 year old male here for evaluation of cloudy vision in the right eye. Patient noted tearing/eye pain/blurry vision beginning at age 9 while in [facility]. His vision rapidly progressed to \"completely dark\" within a couple of years. He has not seen an eye care provider since onset of vision loss up until last month when he saw Dr Mart, who noted NLP vision, corneal scarring with band K, and mild ocular hypertension (in the left eye). He was referred here for evaluation of K scarring/band K and to discuss if anything could be done to improve the vision.      Interval Hx:   Patient reports that the right eye is comfortable. He is using all of his eyedrops as prescribed, including the latanoprost which was refilled last visit. Redness improved.     Past medical hx: None     Past ocular hx:   S/p EDTA chelation for band K OD 3/13/2020  S/p corneal tattoo right eye 9/22/20 - brown black ink     Ocular gtts:   Latanoprost BID left eye   Prednisolone QID OD   - stopped      ASSESSMENT/PLAN:   # S/p corneal tattoo right eye 9/22/20  - good post-op appearance, cosmesis significantly improved  - in future consider black ink if patient desires re-op  - 11/11: improved conj injection  - Meds:   - Taper PF 3/2/1 weekly, then stop   - Tears as needed    # Corneal scarring/band keratopathy, right eye  S/p EDTA chelation for band K right eye 3/13/2020- helped with sensitivity to touch  - unknown etiology - painful vision occurred at age 9 while in [facility]  - 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 Family history of glaucoma- uncle              - Baseline HVF, rnfl oct 3/2020- wnl              - Tmax in our clinic 37 left eye. "    - Appt with Dr. Rawls today for glaucoma check    RTC: in 1 month    Travis Angulo MD  Ophthalmology PGY-3  [facility]     Attending Physician Attestation:  Complete documentation of historical and exam elements from today's encounter can be found in the full encounter summary report (not reduplicated in this progress note).  I personally obtained the chief complaint(s) and history of present illness.  I confirmed and edited as necessary the review of systems, past medical/surgical history, family history, social history, and examination findings as documented by others; and I examined the patient myself.  I personally reviewed the relevant tests, images, and reports as documented above.  I formulated and edited as necessary the assessment and plan and discussed the findings and management plan with the patient and family. - Jules Bundy MD

## 2020-11-11 NOTE — PROGRESS NOTES
Chief Complaint(s) and History of Present Illness(es)     Glaucoma     Laterality: left eye              Comments     Glaucoma evaluation per Dr. Bundy for elevated IOP in LE. Vision in the LE   is the same no changes. RE also the same no changes. Pt denies any eye   pain for discomfort.     Ocular meds:  Latanoprost QHS left eye   Prednisolone QID OD    RENA Alegre 2:39 PM November 11, 2020               Review of systems for the eyes was negative other than the pertinent positives/negatives listed in the HPI.      Assessment & Plan      Rayo Rey is a 35 year old male with the following diagnoses:   1. Glaucoma suspect of left eye - Left Eye    2. 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 Phthisical cornea - Right Eye    4. Glaucoma suspect of left eye         Referral from Dr. Bundy for glaucoma suspicion in the left eye  Unknown etiology for blindness right eye   + Fam history  maximum intraocular pressure 37 left eye   H/O noncompliance with drops  Now using latanoprost faithfully per patient    Pachmetry thick= 621  Baseline Visual field and OCT within normal limits 3/2020  Goal intraocular pressure 21 or lower     High risk suspect with ocular HTN  Continue latanoprost at bedtime left eye   Low threshold for selected laser trabeculoplasty (SLT)       Patient disposition:   Return in about 4 months (around 3/11/2021) for VT only, OCT NFL.           Attending Physician Attestation:  Complete documentation of historical and exam elements from today's encounter can be found in the full encounter summary report (not reduplicated in this progress note).  I personally obtained the chief complaint(s) and history of present illness.  I confirmed and edited as necessary the review of systems, past medical/surgical history, family history, social history, and examination findings as documented by others; and I examined the patient myself.  I personally reviewed the relevant tests, images, and reports as  documented above.  I formulated and edited as necessary the assessment and plan and discussed the findings and management plan with the patient and family. . - Jules Rawls MD

## 2020-11-11 NOTE — NURSING NOTE
Chief Complaint(s) and History of Present Illness(es)     Glaucoma     In left eye.              Comments     Glaucoma evaluation per Dr. Bundy for elevated IOP in LE. Vision in the LE is the same no changes. RE also the same no changes. Pt denies any eye pain for discomfort.     Ocular meds:  Latanoprost QHS left eye   Prednisolone QID OD    RENA Alegre 2:39 PM November 11, 2020

## 2020-11-11 NOTE — PATIENT INSTRUCTIONS
PREDNISOLONE (WHITE CAP):    - Reduce to 3x/day for 1 week, then   - 2x/day for 1 week, then   - 1x/day for 1 week, then stop    LATANOPROST (TEAL CAP): AT BEDTIME IN LEFT EYE

## 2021-02-08 ENCOUNTER — OFFICE VISIT (OUTPATIENT)
Dept: OPHTHALMOLOGY | Facility: CLINIC | Age: 36
End: 2021-02-08
Attending: OPHTHALMOLOGY
Payer: COMMERCIAL

## 2021-02-08 DIAGNOSIS — H17.9 CORNEAL SCAR AND OPACITY: ICD-10-CM

## 2021-02-08 DIAGNOSIS — H40.052 OCULAR HYPERTENSION, LEFT EYE: Primary | ICD-10-CM

## 2021-02-08 DIAGNOSIS — H44.529 PHTHISICAL CORNEA: ICD-10-CM

## 2021-02-08 PROCEDURE — 99213 OFFICE O/P EST LOW 20 MIN: CPT | Mod: GC | Performed by: OPHTHALMOLOGY

## 2021-02-08 PROCEDURE — G0463 HOSPITAL OUTPT CLINIC VISIT: HCPCS

## 2021-02-08 RX ORDER — LATANOPROST 50 UG/ML
1 SOLUTION/ DROPS OPHTHALMIC AT BEDTIME
Qty: 2.5 ML | Refills: 11 | Status: SHIPPED | OUTPATIENT
Start: 2021-02-08 | End: 2021-11-17

## 2021-02-08 ASSESSMENT — VISUAL ACUITY
OD_SC: NLP
OS_SC: 20/20
METHOD: SNELLEN - LINEAR

## 2021-02-08 ASSESSMENT — TONOMETRY
OS_IOP_MMHG: 26
IOP_METHOD: ICARE
OD_IOP_MMHG: 14

## 2021-02-08 ASSESSMENT — CONF VISUAL FIELD
OD_INFERIOR_NASAL_RESTRICTION: 1
OD_SUPERIOR_TEMPORAL_RESTRICTION: 1
OS_NORMAL: 1
OD_SUPERIOR_NASAL_RESTRICTION: 1
OD_INFERIOR_TEMPORAL_RESTRICTION: 1

## 2021-02-08 ASSESSMENT — EXTERNAL EXAM - LEFT EYE: OS_EXAM: NORMAL

## 2021-02-08 ASSESSMENT — EXTERNAL EXAM - RIGHT EYE: OD_EXAM: LARGE XT

## 2021-02-08 ASSESSMENT — SLIT LAMP EXAM - LIDS
COMMENTS: NORMAL
COMMENTS: NORMAL

## 2021-02-08 NOTE — PROGRESS NOTES
"CC: cloudy vision right eye     HPI: Rayo Rey is a 34 year old male here for evaluation of cloudy vision in the right eye. Patient noted tearing/eye pain/blurry vision beginning at age 9 while in [facility]. His vision rapidly progressed to \"completely dark\" within a couple of years. He has not seen an eye care provider since onset of vision loss up until last month when he saw Dr Mart, who noted NLP vision, corneal scarring with band K, and mild ocular hypertension (in the left eye). He was referred here for evaluation of K scarring/band K and to discuss if anything could be done to improve the vision.      Interval Hx:   Patient reports that the right eye is comfortable.  No pain.  No longer using any drops x 1 month.  Denies redness.     Past medical hx: None     Past ocular hx:   S/p EDTA chelation for band K OD 3/13/2020  S/p corneal tattoo right eye 9/22/20 - brown black ink     Ocular gtts:   None currently        ASSESSMENT/PLAN:   # S/p corneal tattoo right eye 9/22/20  - good post-op appearance, cosmesis significantly improved  - in future consider black ink if patient desires re-op  - 11/11: improved conj injection  - 2/8/21: tapered off all drops, doing well, no pain.  Can use PF prn daily for redness if needed.     # Corneal scarring/band keratopathy, right eye  S/p EDTA chelation for band K right eye 3/13/2020- helped with sensitivity to touch  - unknown etiology - painful vision occurred at age 9 while in [facility]  - 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 Family history of glaucoma- uncle              - Baseline HVF, rnfl oct 3/2020- wnl              - Tmax in our clinic 26 left eye. 2/8/21   - Resume latanoprost at bedtime left eye     - " establish care with Dr. Caass / glaucoma   - stressed importance of IOP control, particularly given NLP right eye    #Monocular   - monocular precautions given.     RTC: 12 months, sooner prn.   Glaucoma next available 2-3 months     Jason Goldberg, MD  Cornea & External Disease Fellow  Department of Ophthalmology and Visual Neurosciences    Attending Physician Attestation:  Complete documentation of historical and exam elements from today's encounter can be found in the full encounter summary report (not reduplicated in this progress note).  I personally obtained the chief complaint(s) and history of present illness.  I confirmed and edited as necessary the review of systems, past medical/surgical history, family history, social history, and examination findings as documented by others; and I examined the patient myself.  I personally reviewed the relevant tests, images, and reports as documented above.  I formulated and edited as necessary the assessment and plan and discussed the findings and management plan with the patient and family. - Jules Bundy MD

## 2021-02-08 NOTE — NURSING NOTE
Chief Complaint(s) and History of Present Illness(es)     Follow Up     In right eye.  Associated symptoms include Negative for eye pain, dryness, redness and tearing.  Pain was noted as 0/10.              Comments     3 month f/u for S/p corneal tattoo right eye 9/22/20 (Corneal scarring/band keratopathy, right eye). Pt notes RE is doing well no changes or concerns. LE is also doing well, vision is good, no changes.     Ocular meds:  Latanoprost BID LE (pt states stopped doing that drop about 1 month ago.)  Pred QID RE (Pt notes all done with the drops)    Janey Draper, RENA 2:24 PM February 8, 2021

## 2021-05-10 DIAGNOSIS — H40.052 OCULAR HYPERTENSION, LEFT EYE: Primary | ICD-10-CM

## 2021-05-12 ENCOUNTER — OFFICE VISIT (OUTPATIENT)
Dept: OPHTHALMOLOGY | Facility: CLINIC | Age: 36
End: 2021-05-12
Attending: OPHTHALMOLOGY
Payer: COMMERCIAL

## 2021-05-12 DIAGNOSIS — H18.421 BAND KERATOPATHY OF RIGHT EYE: ICD-10-CM

## 2021-05-12 DIAGNOSIS — H44.529 PHTHISICAL CORNEA: ICD-10-CM

## 2021-05-12 DIAGNOSIS — H40.002 GLAUCOMA SUSPECT OF LEFT EYE: Primary | ICD-10-CM

## 2021-05-12 DIAGNOSIS — H40.052 OCULAR HYPERTENSION, LEFT EYE: ICD-10-CM

## 2021-05-12 PROCEDURE — 99214 OFFICE O/P EST MOD 30 MIN: CPT | Performed by: OPHTHALMOLOGY

## 2021-05-12 PROCEDURE — G0463 HOSPITAL OUTPT CLINIC VISIT: HCPCS

## 2021-05-12 PROCEDURE — 92133 CPTRZD OPH DX IMG PST SGM ON: CPT | Mod: 26 | Performed by: OPHTHALMOLOGY

## 2021-05-12 ASSESSMENT — VISUAL ACUITY
OD_SC: LP
OS_SC: 20/20
METHOD: SNELLEN - LINEAR

## 2021-05-12 ASSESSMENT — SLIT LAMP EXAM - LIDS
COMMENTS: NORMAL
COMMENTS: NORMAL

## 2021-05-12 ASSESSMENT — EXTERNAL EXAM - LEFT EYE: OS_EXAM: NORMAL

## 2021-05-12 ASSESSMENT — TONOMETRY
OS_IOP_MMHG: 24
OD_IOP_MMHG: 12
IOP_METHOD: APPLANATION

## 2021-05-12 ASSESSMENT — CONF VISUAL FIELD
OS_NORMAL: 1
OD_INFERIOR_NASAL_RESTRICTION: 1
OD_INFERIOR_TEMPORAL_RESTRICTION: 1
OD_SUPERIOR_NASAL_RESTRICTION: 1
OD_SUPERIOR_TEMPORAL_RESTRICTION: 1

## 2021-05-12 ASSESSMENT — CUP TO DISC RATIO: OS_RATIO: 0.4

## 2021-05-12 ASSESSMENT — EXTERNAL EXAM - RIGHT EYE: OD_EXAM: LARGE XT

## 2021-05-12 NOTE — NURSING NOTE
Chief Complaints and History of Present Illnesses   Patient presents with     Follow Up     Ocular hypertension, left eye - Left Eye     Chief Complaint(s) and History of Present Illness(es)     Follow Up     Laterality: left eye    Onset: 7 months ago    Course: stable    Associated symptoms: Negative for eye pain, dryness, tearing, floaters, flashes, photophobia, glare and haloes    Treatments tried: eye drops    Pain scale: 0/10    Comments: Ocular hypertension, left eye - Left Eye              Comments     Pt states vision is stable since last visit.  Pt is compliant with drops.    Latanoprost LE at bedtime    LUI Bocanegra May 12, 2021 2:58 PM

## 2021-05-12 NOTE — PROGRESS NOTES
Chief Complaint(s) and History of Present Illness(es)     Follow Up     Laterality: left eye    Onset: 7 months ago    Course: stable    Associated symptoms: Negative for eye pain, dryness, tearing, floaters,   flashes, photophobia, glare and haloes    Treatments tried: eye drops    Pain scale: 0/10    Comments: Ocular hypertension, left eye - Left Eye              Comments     Pt states vision is stable since last visit.  Pt is compliant with drops.    Latanoprost LE at bedtime    LUI Bocanegra May 12, 2021 2:58 PM              Review of systems for the eyes was negative other than the pertinent positives/negatives listed in the HPI.      Assessment & Plan      Rayo Rey is a 35 year old male with the following diagnoses:   1. Glaucoma suspect of left eye - Left Eye    2. Ocular hypertension, left eye - Left Eye    3. Band keratopathy of right eye - Right Eye    4. Phthisical cornea - Right Eye         Unknown etiology for blindness right eye   + Fam history  Maximum intraocular pressure 37 left eye   H/O noncompliance with drops; recently resumed drops after cornea visit  On latanoprost at bedtime left eye     Pachmetry thick= 621  OCT Nerve fiber layer remains stable today   Intraocular pressure borderline    High risk suspect with ocular HTN  Continue latanoprost at bedtime left eye   Discussed need for chronic treatment and recommended management in detail   Compliance encouraged  Selected laser trabeculoplasty (SLT) next visit if still elevated or found to have nerve changes  Return precautions reviewed     Patient disposition:   Return in about 6 months (around 11/12/2021) for 24-2 Dynamic VF, OCT NFL LEFT eye, DFE.           Attending Physician Attestation:  Complete documentation of historical and exam elements from today's encounter can be found in the full encounter summary report (not reduplicated in this progress note).  I personally obtained the chief complaint(s) and history of present illness.   I confirmed and edited as necessary the review of systems, past medical/surgical history, family history, social history, and examination findings as documented by others; and I examined the patient myself.  I personally reviewed the relevant tests, images, and reports as documented above.  I formulated and edited as necessary the assessment and plan and discussed the findings and management plan with the patient and family. . - Jules Rawls MD

## 2021-11-16 DIAGNOSIS — H40.052 OCULAR HYPERTENSION, LEFT EYE: Primary | ICD-10-CM

## 2021-11-17 ENCOUNTER — OFFICE VISIT (OUTPATIENT)
Dept: OPHTHALMOLOGY | Facility: CLINIC | Age: 36
End: 2021-11-17
Attending: OPHTHALMOLOGY
Payer: COMMERCIAL

## 2021-11-17 DIAGNOSIS — H18.421 BAND KERATOPATHY OF RIGHT EYE: ICD-10-CM

## 2021-11-17 DIAGNOSIS — H40.052 OCULAR HYPERTENSION, LEFT EYE: ICD-10-CM

## 2021-11-17 DIAGNOSIS — H17.9 CORNEAL SCAR AND OPACITY: ICD-10-CM

## 2021-11-17 PROCEDURE — 99214 OFFICE O/P EST MOD 30 MIN: CPT | Performed by: OPHTHALMOLOGY

## 2021-11-17 PROCEDURE — G0463 HOSPITAL OUTPT CLINIC VISIT: HCPCS | Mod: 25

## 2021-11-17 PROCEDURE — 92133 CPTRZD OPH DX IMG PST SGM ON: CPT | Performed by: OPHTHALMOLOGY

## 2021-11-17 PROCEDURE — 92083 EXTENDED VISUAL FIELD XM: CPT | Performed by: OPHTHALMOLOGY

## 2021-11-17 RX ORDER — TIMOLOL MALEATE 5 MG/ML
1 SOLUTION/ DROPS OPHTHALMIC EVERY MORNING
Qty: 5 ML | Refills: 3 | Status: SHIPPED | OUTPATIENT
Start: 2021-11-17

## 2021-11-17 RX ORDER — TIMOLOL MALEATE 5 MG/ML
1 SOLUTION/ DROPS OPHTHALMIC 2 TIMES DAILY
COMMUNITY

## 2021-11-17 RX ORDER — LATANOPROST 50 UG/ML
1 SOLUTION/ DROPS OPHTHALMIC AT BEDTIME
Qty: 2.5 ML | Refills: 11 | Status: SHIPPED | OUTPATIENT
Start: 2021-11-17

## 2021-11-17 RX ORDER — PREDNISOLONE ACETATE 10 MG/ML
1 SUSPENSION/ DROPS OPHTHALMIC AT BEDTIME
Qty: 5 ML | Refills: 1 | Status: SHIPPED | OUTPATIENT
Start: 2021-11-17

## 2021-11-17 ASSESSMENT — CONF VISUAL FIELD
OD_SUPERIOR_NASAL_RESTRICTION: 1
OD_SUPERIOR_TEMPORAL_RESTRICTION: 1
OS_INFERIOR_NASAL_RESTRICTION: 3
OD_INFERIOR_TEMPORAL_RESTRICTION: 1
OD_INFERIOR_NASAL_RESTRICTION: 1

## 2021-11-17 ASSESSMENT — TONOMETRY
IOP_METHOD: TONOPEN
IOP_METHOD: TONOPEN
OD_IOP_MMHG: 30
OD_IOP_MMHG: 28

## 2021-11-17 ASSESSMENT — VISUAL ACUITY
OS_SC: 20/20
OS_SC+: +2
METHOD: SNELLEN - LINEAR
OD_SC: NLP

## 2021-11-17 ASSESSMENT — REFRACTION_WEARINGRX
OS_AXIS: 170
OS_SPHERE: PLANO
OD_SPHERE: BALANCE
OS_CYLINDER: +0.25

## 2021-11-17 NOTE — PATIENT INSTRUCTIONS
RIGHT EYE    PREDNISOLONE (PINK CAP): at bedtime as needed for redness    LEFT EYE    LATANOPROST (TEAL CAP): AT BEDTIME   TIMOLOL (YELLOW CAP): EACH MORNING

## 2021-11-17 NOTE — NURSING NOTE
Chief Complaints and History of Present Illnesses   Patient presents with     Glaucoma Follow Up     6 month follow up KAYLEN     Chief Complaint(s) and History of Present Illness(es)     Glaucoma Follow Up     Comments: 6 month follow up LE              Comments     Pt states vision is about the same as last visit. No eye pain today.  No flashes or floaters. No redness or dryness.    RENA Leone November 17, 2021 3:07 PM

## 2021-11-22 ASSESSMENT — EXTERNAL EXAM - LEFT EYE: OS_EXAM: NORMAL

## 2021-11-22 ASSESSMENT — SLIT LAMP EXAM - LIDS
COMMENTS: NORMAL
COMMENTS: NORMAL

## 2021-11-22 ASSESSMENT — TONOMETRY: OS_IOP_MMHG: 19

## 2021-11-22 ASSESSMENT — CUP TO DISC RATIO: OS_RATIO: 0.4

## 2021-11-22 ASSESSMENT — EXTERNAL EXAM - RIGHT EYE: OD_EXAM: LARGE XT

## 2021-11-23 NOTE — PROGRESS NOTES
Chief Complaint(s) and History of Present Illness(es)     Glaucoma Follow Up     Comments: 6 month follow up LE              Comments     Pt states vision is about the same as last visit. No eye pain today.  No flashes or floaters. No redness or dryness.    RENA Leone November 17, 2021 3:07 PM                Review of systems for the eyes was negative other than the pertinent positives/negatives listed in the HPI.      Assessment & Plan      Rayo Rey is a 36 year old male with the following diagnoses:   1. Ocular hypertension, left eye    2. 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 Corneal scar and opacity - Right Eye         Here today for complete eye exam.  Some confusion with eye drop instructions.  Reviewed in detail, given written instructions.  Expressed understanding  Unknown etiology for blindness right eye   + Fam history  Maximum intraocular pressure 37 left eye   H/O noncompliance with drops    Pachmetry thick= 621  OCT Nerve fiber layer remains stable today   Intraocular pressure at goal left eye     High risk suspect with ocular HTN  Continue latanoprost at bedtime; timolol every morning left eye   Discussed need for chronic treatment and recommended management in detail   Compliance encouraged  Monocular precautions   Return precautions reviewed         Patient disposition:   Return in about 6 months (around 5/17/2022) for VT only.           Attending Physician Attestation:  Complete documentation of historical and exam elements from today's encounter can be found in the full encounter summary report (not reduplicated in this progress note).  I personally obtained the chief complaint(s) and history of present illness.  I confirmed and edited as necessary the review of systems, past medical/surgical history, family history, social history, and examination findings as documented by others; and I examined the patient myself.  I personally reviewed the relevant tests, images, and  reports as documented above.  I formulated and edited as necessary the assessment and plan and discussed the findings and management plan with the patient and family. . - Jules Rawls MD

## 2022-05-18 ENCOUNTER — OFFICE VISIT (OUTPATIENT)
Dept: OPHTHALMOLOGY | Facility: CLINIC | Age: 37
End: 2022-05-18
Attending: OPHTHALMOLOGY
Payer: COMMERCIAL

## 2022-05-18 DIAGNOSIS — H40.052 OCULAR HYPERTENSION, LEFT EYE: Primary | ICD-10-CM

## 2022-05-18 DIAGNOSIS — H44.529 PHTHISICAL CORNEA: ICD-10-CM

## 2022-05-18 DIAGNOSIS — H40.002 GLAUCOMA SUSPECT OF LEFT EYE: ICD-10-CM

## 2022-05-18 PROCEDURE — 99214 OFFICE O/P EST MOD 30 MIN: CPT | Mod: GC | Performed by: OPHTHALMOLOGY

## 2022-05-18 PROCEDURE — G0463 HOSPITAL OUTPT CLINIC VISIT: HCPCS

## 2022-05-18 ASSESSMENT — CONF VISUAL FIELD
OS_NORMAL: 1
OD_INFERIOR_TEMPORAL_RESTRICTION: 1
OD_SUPERIOR_NASAL_RESTRICTION: 1
OD_SUPERIOR_TEMPORAL_RESTRICTION: 1
OD_INFERIOR_NASAL_RESTRICTION: 1

## 2022-05-18 ASSESSMENT — VISUAL ACUITY
METHOD: SNELLEN - LINEAR
OD_SC: NLP
OS_SC: 20/20

## 2022-05-18 ASSESSMENT — SLIT LAMP EXAM - LIDS
COMMENTS: NORMAL
COMMENTS: NORMAL

## 2022-05-18 ASSESSMENT — TONOMETRY
OD_IOP_MMHG: UNAB
IOP_METHOD: TONOPEN
OS_IOP_MMHG: 34
OS_IOP_MMHG: 29
OS_IOP_MMHG: 30

## 2022-05-18 ASSESSMENT — CUP TO DISC RATIO: OS_RATIO: 0.45

## 2022-05-18 ASSESSMENT — EXTERNAL EXAM - RIGHT EYE: OD_EXAM: LARGE XT

## 2022-05-18 ASSESSMENT — EXTERNAL EXAM - LEFT EYE: OS_EXAM: NORMAL

## 2022-05-18 NOTE — PATIENT INSTRUCTIONS
USE TIMOLOL (YELLOW) 1x EVERY MORNING in the LEFT eye    USE LATANOPROST (TEAL) 1x EVERY NIGHT in the LEFT eye

## 2022-05-18 NOTE — NURSING NOTE
Chief Complaints and History of Present Illnesses   Patient presents with     Follow Up     Chief Complaint(s) and History of Present Illness(es)     Follow Up               Comments     Pt states that there have been no changes in his vision and that things are going well.   Pt states he only uses prednisolone in the right eye, but since the drops burn his left eye and distort his vision momentarily, he does not use any drops in the left eye.  Pt denies pain, other concerns.    Michael Starks OT 2:44 PM May 18, 2022

## 2022-05-18 NOTE — PROGRESS NOTES
Chief Complaint(s) and History of Present Illness(es)     Follow Up               Comments     Pt states that there have been no changes in his vision and that things   are going well.   Pt states he only uses prednisolone in the right eye, but since the drops   burn his left eye and distort his vision momentarily, he does not use any   drops in the left eye.  Pt denies pain, other concerns.    Michaelglenn Starks OT 2:44 PM May 18, 2022            Review of systems for the eyes was negative other than the pertinent positives/negatives listed in the HPI.      Assessment & Plan      Rayo Rey is a 36 year old male with the following diagnoses:   1. Ocular hypertension, left eye    2. Glaucoma suspect of left eye - Left Eye    3. Phthisical cornea - Right Eye         Unknown etiology for blindness right eye   Glaucoma suspect in the left eye   + Fam history  Maximum intraocular pressure 37 left eye   H/O noncompliance with drops     Pachmetry thick= 621  At the last visit, OCT retinal nerve fiber layer looked stable and VF were normal.    Has been off all his in the left eye drops since 2 months ago (latanoprost and timolol).  States that drops blur his vision and make him feel uncomfortable.   Intraocular pressure on applanation today is 29 in the left eye.    Discussed r/b/a of selected laser trabeculoplasty (SLT) versus topical glaucoma drops versus observation.  Patient would like to retry his drops.  Will restart timolol qAM left eye and latanoprost at bedtime left eye   Cont Predforte  Right eye     Monocular precautions   Return precautions reviewed       Patient disposition:   Return in about 4 weeks (around 6/15/2022) for v/t, OCT NFL.           Nova Bardales MD  PGY-3 Resident Physician  Department of Ophthalmology    Attending Physician Attestation:  Complete documentation of historical and exam elements from today's encounter can be found in the full encounter summary report (not reduplicated in this progress  note).  I personally obtained the chief complaint(s) and history of present illness.  I confirmed and edited as necessary the review of systems, past medical/surgical history, family history, social history, and examination findings as documented by others; and I examined the patient myself.  I personally reviewed the relevant tests, images, and reports as documented above.  I formulated and edited as necessary the assessment and plan and discussed the findings and management plan with the patient and family. . - Jules Rawls MD

## 2022-05-27 ENCOUNTER — TELEPHONE (OUTPATIENT)
Dept: OPHTHALMOLOGY | Facility: CLINIC | Age: 37
End: 2022-05-27
Payer: COMMERCIAL

## 2022-05-27 NOTE — TELEPHONE ENCOUNTER
Called patient to discuss drop tolerance.  Message left to call back.    Jules Rawls MD  , Comprehensive Ophthalmology  Department of Ophthalmology and Visual Neurosciences  [facility]

## 2022-07-07 ENCOUNTER — TELEPHONE (OUTPATIENT)
Dept: OPHTHALMOLOGY | Facility: CLINIC | Age: 37
End: 2022-07-07

## 2022-07-07 NOTE — TELEPHONE ENCOUNTER
Dr. Rawls would like to see you back for a pressure check sometime around August. Please all us back at [phone] to schedule.    JUANA GAMING 1:19 PM July 7, 2022

## (undated) DEVICE — BLADE KNIFE BEAVER 6900

## (undated) DEVICE — EYE SPONGE SPEAR WECK CEL 0008685

## (undated) DEVICE — LINEN TOWEL PACK X5 5464

## (undated) DEVICE — EYE SHIELD PLASTIC

## (undated) DEVICE — PACK CATARACT CUSTOM SO DALE SEY32CTFCX

## (undated) DEVICE — TAPE MICROPORE 2"X1.5YD 1530S-2

## (undated) DEVICE — APPLICATOR COTTON TIP 3" X50

## (undated) DEVICE — Device

## (undated) DEVICE — NDL 30GA 0.5" 305106

## (undated) DEVICE — GLOVE PROTEXIS MICRO 6.5  2D73PM65

## (undated) DEVICE — EYE CANN IRR 27GA ANTERIOR CHAMBER 581280

## (undated) DEVICE — DRAPE STERI APERATURE 51X33" 1030

## (undated) DEVICE — TAPE MICROPORE 1"X1.5YD 1530S-1

## (undated) RX ORDER — ACETAMINOPHEN 325 MG/1
TABLET ORAL
Status: DISPENSED
Start: 2020-09-22

## (undated) RX ORDER — GABAPENTIN 300 MG/1
CAPSULE ORAL
Status: DISPENSED
Start: 2020-09-22

## (undated) RX ORDER — FENTANYL CITRATE 50 UG/ML
INJECTION, SOLUTION INTRAMUSCULAR; INTRAVENOUS
Status: DISPENSED
Start: 2020-09-22

## (undated) RX ORDER — ONDANSETRON 2 MG/ML
INJECTION INTRAMUSCULAR; INTRAVENOUS
Status: DISPENSED
Start: 2020-09-22